# Patient Record
Sex: FEMALE | Race: WHITE | URBAN - METROPOLITAN AREA
[De-identification: names, ages, dates, MRNs, and addresses within clinical notes are randomized per-mention and may not be internally consistent; named-entity substitution may affect disease eponyms.]

---

## 2019-09-23 ENCOUNTER — OUTPATIENT (OUTPATIENT)
Dept: INPATIENT UNIT | Facility: HOSPITAL | Age: 74
LOS: 1 days | Discharge: HOME | End: 2019-09-23
Payer: MEDICARE

## 2019-09-23 VITALS
WEIGHT: 151.24 LBS | RESPIRATION RATE: 16 BRPM | TEMPERATURE: 98 F | SYSTOLIC BLOOD PRESSURE: 145 MMHG | HEIGHT: 66 IN | OXYGEN SATURATION: 100 % | HEART RATE: 63 BPM | DIASTOLIC BLOOD PRESSURE: 77 MMHG

## 2019-09-23 VITALS
OXYGEN SATURATION: 100 % | SYSTOLIC BLOOD PRESSURE: 121 MMHG | HEART RATE: 61 BPM | RESPIRATION RATE: 15 BRPM | DIASTOLIC BLOOD PRESSURE: 62 MMHG

## 2019-09-23 DIAGNOSIS — H25.11 AGE-RELATED NUCLEAR CATARACT, RIGHT EYE: ICD-10-CM

## 2019-09-23 PROCEDURE — C1780: CPT

## 2019-09-23 RX ORDER — ACETAMINOPHEN 500 MG
650 TABLET ORAL EVERY 6 HOURS
Refills: 0 | Status: DISCONTINUED | OUTPATIENT
Start: 2019-09-23 | End: 2019-09-23

## 2019-09-23 RX ORDER — PHENYLEPHRINE HCL 2.5 %
1 DROPS OPHTHALMIC (EYE)
Refills: 0 | Status: COMPLETED | OUTPATIENT
Start: 2019-09-23 | End: 2019-09-23

## 2019-09-23 RX ORDER — CYCLOPENTOLATE HYDROCHLORIDE 10 MG/ML
1 SOLUTION/ DROPS OPHTHALMIC
Refills: 0 | Status: COMPLETED | OUTPATIENT
Start: 2019-09-23 | End: 2019-09-23

## 2019-09-23 RX ORDER — OFLOXACIN 0.3 %
1 DROPS OPHTHALMIC (EYE)
Refills: 0 | Status: COMPLETED | OUTPATIENT
Start: 2019-09-23 | End: 2019-09-23

## 2019-09-23 RX ORDER — ACETAMINOPHEN 500 MG
2 TABLET ORAL
Qty: 0 | Refills: 0 | DISCHARGE
Start: 2019-09-23

## 2019-09-23 RX ORDER — TROPICAMIDE 1 %
1 DROPS OPHTHALMIC (EYE)
Refills: 0 | Status: COMPLETED | OUTPATIENT
Start: 2019-09-23 | End: 2019-09-23

## 2019-09-23 RX ADMIN — Medication 1 DROP(S): at 13:01

## 2019-09-23 RX ADMIN — Medication 1 DROP(S): at 13:09

## 2019-09-23 RX ADMIN — CYCLOPENTOLATE HYDROCHLORIDE 1 DROP(S): 10 SOLUTION/ DROPS OPHTHALMIC at 13:02

## 2019-09-23 RX ADMIN — Medication 1 DROP(S): at 13:14

## 2019-09-23 RX ADMIN — CYCLOPENTOLATE HYDROCHLORIDE 1 DROP(S): 10 SOLUTION/ DROPS OPHTHALMIC at 13:13

## 2019-09-23 RX ADMIN — Medication 1 DROP(S): at 13:02

## 2019-09-23 RX ADMIN — Medication 1 DROP(S): at 13:13

## 2019-09-23 RX ADMIN — CYCLOPENTOLATE HYDROCHLORIDE 1 DROP(S): 10 SOLUTION/ DROPS OPHTHALMIC at 13:09

## 2019-09-23 RX ADMIN — Medication 1 DROP(S): at 13:03

## 2019-09-23 NOTE — BRIEF OPERATIVE NOTE - NSICDXBRIEFPOSTOP_GEN_ALL_CORE_FT
POST-OP DIAGNOSIS:  Nuclear sclerotic cataract of right eye 23-Sep-2019 14:01:11  Kingsley Castañeda

## 2019-09-30 DIAGNOSIS — H26.9 UNSPECIFIED CATARACT: ICD-10-CM

## 2019-09-30 DIAGNOSIS — E78.5 HYPERLIPIDEMIA, UNSPECIFIED: ICD-10-CM

## 2019-09-30 DIAGNOSIS — E03.9 HYPOTHYROIDISM, UNSPECIFIED: ICD-10-CM

## 2019-09-30 DIAGNOSIS — I10 ESSENTIAL (PRIMARY) HYPERTENSION: ICD-10-CM

## 2019-09-30 DIAGNOSIS — F32.9 MAJOR DEPRESSIVE DISORDER, SINGLE EPISODE, UNSPECIFIED: ICD-10-CM

## 2019-09-30 DIAGNOSIS — H25.11 AGE-RELATED NUCLEAR CATARACT, RIGHT EYE: ICD-10-CM

## 2021-06-18 ENCOUNTER — TRANSCRIPTION ENCOUNTER (OUTPATIENT)
Age: 76
End: 2021-06-18

## 2021-09-08 ENCOUNTER — TRANSCRIPTION ENCOUNTER (OUTPATIENT)
Age: 76
End: 2021-09-08

## 2021-09-13 ENCOUNTER — INPATIENT (INPATIENT)
Facility: HOSPITAL | Age: 76
LOS: 2 days | Discharge: HOME | DRG: 177 | End: 2021-09-16
Attending: FAMILY MEDICINE | Admitting: INTERNAL MEDICINE
Payer: MEDICARE

## 2021-09-13 VITALS — HEIGHT: 66 IN | WEIGHT: 145.06 LBS

## 2021-09-13 DIAGNOSIS — U07.1 COVID-19: ICD-10-CM

## 2021-09-13 PROBLEM — E03.9 HYPOTHYROIDISM, UNSPECIFIED: Chronic | Status: ACTIVE | Noted: 2019-09-20

## 2021-09-13 PROBLEM — I10 ESSENTIAL (PRIMARY) HYPERTENSION: Chronic | Status: ACTIVE | Noted: 2019-09-20

## 2021-09-13 PROBLEM — E78.5 HYPERLIPIDEMIA, UNSPECIFIED: Chronic | Status: ACTIVE | Noted: 2019-09-20

## 2021-09-13 PROBLEM — R00.1 BRADYCARDIA, UNSPECIFIED: Chronic | Status: ACTIVE | Noted: 2019-09-20

## 2021-09-13 LAB
ALBUMIN SERPL ELPH-MCNC: 3 G/DL — LOW (ref 3.3–5)
ALP SERPL-CCNC: 59 U/L — SIGNIFICANT CHANGE UP (ref 40–120)
ALT FLD-CCNC: 11 U/L — LOW (ref 12–78)
ANION GAP SERPL CALC-SCNC: 7 MMOL/L — SIGNIFICANT CHANGE UP (ref 5–17)
AST SERPL-CCNC: 37 U/L — SIGNIFICANT CHANGE UP (ref 15–37)
BASOPHILS # BLD AUTO: 0.01 K/UL — SIGNIFICANT CHANGE UP (ref 0–0.2)
BASOPHILS NFR BLD AUTO: 0.2 % — SIGNIFICANT CHANGE UP (ref 0–2)
BILIRUB SERPL-MCNC: 0.6 MG/DL — SIGNIFICANT CHANGE UP (ref 0.2–1.2)
BUN SERPL-MCNC: 19 MG/DL — SIGNIFICANT CHANGE UP (ref 7–23)
CALCIUM SERPL-MCNC: 9.2 MG/DL — SIGNIFICANT CHANGE UP (ref 8.5–10.1)
CHLORIDE SERPL-SCNC: 99 MMOL/L — SIGNIFICANT CHANGE UP (ref 96–108)
CO2 SERPL-SCNC: 30 MMOL/L — SIGNIFICANT CHANGE UP (ref 22–31)
CREAT SERPL-MCNC: 0.85 MG/DL — SIGNIFICANT CHANGE UP (ref 0.5–1.3)
CRP SERPL-MCNC: 104 MG/L — HIGH
D DIMER BLD IA.RAPID-MCNC: 165 NG/ML DDU — SIGNIFICANT CHANGE UP
EOSINOPHIL # BLD AUTO: 0.06 K/UL — SIGNIFICANT CHANGE UP (ref 0–0.5)
EOSINOPHIL NFR BLD AUTO: 1 % — SIGNIFICANT CHANGE UP (ref 0–6)
FERRITIN SERPL-MCNC: 352 NG/ML — HIGH (ref 15–150)
GLUCOSE SERPL-MCNC: 101 MG/DL — HIGH (ref 70–99)
HCT VFR BLD CALC: 39.1 % — SIGNIFICANT CHANGE UP (ref 34.5–45)
HGB BLD-MCNC: 13.2 G/DL — SIGNIFICANT CHANGE UP (ref 11.5–15.5)
IMM GRANULOCYTES NFR BLD AUTO: 0.5 % — SIGNIFICANT CHANGE UP (ref 0–1.5)
LYMPHOCYTES # BLD AUTO: 0.73 K/UL — LOW (ref 1–3.3)
LYMPHOCYTES # BLD AUTO: 12.4 % — LOW (ref 13–44)
MCHC RBC-ENTMCNC: 28.9 PG — SIGNIFICANT CHANGE UP (ref 27–34)
MCHC RBC-ENTMCNC: 33.8 GM/DL — SIGNIFICANT CHANGE UP (ref 32–36)
MCV RBC AUTO: 85.6 FL — SIGNIFICANT CHANGE UP (ref 80–100)
MONOCYTES # BLD AUTO: 0.25 K/UL — SIGNIFICANT CHANGE UP (ref 0–0.9)
MONOCYTES NFR BLD AUTO: 4.2 % — SIGNIFICANT CHANGE UP (ref 2–14)
NEUTROPHILS # BLD AUTO: 4.82 K/UL — SIGNIFICANT CHANGE UP (ref 1.8–7.4)
NEUTROPHILS NFR BLD AUTO: 81.7 % — HIGH (ref 43–77)
PLATELET # BLD AUTO: 333 K/UL — SIGNIFICANT CHANGE UP (ref 150–400)
POTASSIUM SERPL-MCNC: 3.1 MMOL/L — LOW (ref 3.5–5.3)
POTASSIUM SERPL-SCNC: 3.1 MMOL/L — LOW (ref 3.5–5.3)
PROCALCITONIN SERPL-MCNC: 0.05 NG/ML — SIGNIFICANT CHANGE UP (ref 0.02–0.1)
PROT SERPL-MCNC: 7.2 GM/DL — SIGNIFICANT CHANGE UP (ref 6–8.3)
RBC # BLD: 4.57 M/UL — SIGNIFICANT CHANGE UP (ref 3.8–5.2)
RBC # FLD: 12.6 % — SIGNIFICANT CHANGE UP (ref 10.3–14.5)
SODIUM SERPL-SCNC: 136 MMOL/L — SIGNIFICANT CHANGE UP (ref 135–145)
WBC # BLD: 5.9 K/UL — SIGNIFICANT CHANGE UP (ref 3.8–10.5)
WBC # FLD AUTO: 5.9 K/UL — SIGNIFICANT CHANGE UP (ref 3.8–10.5)

## 2021-09-13 PROCEDURE — 80053 COMPREHEN METABOLIC PANEL: CPT

## 2021-09-13 PROCEDURE — C9399: CPT

## 2021-09-13 PROCEDURE — 83036 HEMOGLOBIN GLYCOSYLATED A1C: CPT

## 2021-09-13 PROCEDURE — 86769 SARS-COV-2 COVID-19 ANTIBODY: CPT

## 2021-09-13 PROCEDURE — 85379 FIBRIN DEGRADATION QUANT: CPT

## 2021-09-13 PROCEDURE — 71045 X-RAY EXAM CHEST 1 VIEW: CPT | Mod: 26

## 2021-09-13 PROCEDURE — 94760 N-INVAS EAR/PLS OXIMETRY 1: CPT

## 2021-09-13 PROCEDURE — 99285 EMERGENCY DEPT VISIT HI MDM: CPT | Mod: CS

## 2021-09-13 PROCEDURE — 85025 COMPLETE CBC W/AUTO DIFF WBC: CPT

## 2021-09-13 PROCEDURE — 99222 1ST HOSP IP/OBS MODERATE 55: CPT

## 2021-09-13 PROCEDURE — 85610 PROTHROMBIN TIME: CPT

## 2021-09-13 PROCEDURE — 80048 BASIC METABOLIC PNL TOTAL CA: CPT

## 2021-09-13 PROCEDURE — 80076 HEPATIC FUNCTION PANEL: CPT

## 2021-09-13 PROCEDURE — 36415 COLL VENOUS BLD VENIPUNCTURE: CPT

## 2021-09-13 PROCEDURE — 82248 BILIRUBIN DIRECT: CPT

## 2021-09-13 PROCEDURE — 93010 ELECTROCARDIOGRAM REPORT: CPT

## 2021-09-13 RX ORDER — HYDROCHLOROTHIAZIDE 25 MG
12.5 TABLET ORAL DAILY
Refills: 0 | Status: DISCONTINUED | OUTPATIENT
Start: 2021-09-13 | End: 2021-09-16

## 2021-09-13 RX ORDER — DEXAMETHASONE 0.5 MG/5ML
6 ELIXIR ORAL DAILY
Refills: 0 | Status: DISCONTINUED | OUTPATIENT
Start: 2021-09-14 | End: 2021-09-16

## 2021-09-13 RX ORDER — SIMVASTATIN 20 MG/1
1 TABLET, FILM COATED ORAL
Qty: 0 | Refills: 0 | DISCHARGE

## 2021-09-13 RX ORDER — REMDESIVIR 5 MG/ML
100 INJECTION INTRAVENOUS EVERY 24 HOURS
Refills: 0 | Status: DISCONTINUED | OUTPATIENT
Start: 2021-09-14 | End: 2021-09-16

## 2021-09-13 RX ORDER — LATANOPROST 0.05 MG/ML
1 SOLUTION/ DROPS OPHTHALMIC; TOPICAL
Qty: 0 | Refills: 0 | DISCHARGE

## 2021-09-13 RX ORDER — REMDESIVIR 5 MG/ML
200 INJECTION INTRAVENOUS EVERY 24 HOURS
Refills: 0 | Status: COMPLETED | OUTPATIENT
Start: 2021-09-13 | End: 2021-09-13

## 2021-09-13 RX ORDER — LISINOPRIL/HYDROCHLOROTHIAZIDE 10-12.5 MG
1 TABLET ORAL
Qty: 0 | Refills: 0 | DISCHARGE

## 2021-09-13 RX ORDER — REMDESIVIR 5 MG/ML
INJECTION INTRAVENOUS
Refills: 0 | Status: DISCONTINUED | OUTPATIENT
Start: 2021-09-13 | End: 2021-09-16

## 2021-09-13 RX ORDER — AMLODIPINE BESYLATE 2.5 MG/1
1 TABLET ORAL
Qty: 0 | Refills: 0 | DISCHARGE

## 2021-09-13 RX ORDER — LATANOPROST 0.05 MG/ML
1 SOLUTION/ DROPS OPHTHALMIC; TOPICAL AT BEDTIME
Refills: 0 | Status: DISCONTINUED | OUTPATIENT
Start: 2021-09-13 | End: 2021-09-16

## 2021-09-13 RX ORDER — LISINOPRIL 2.5 MG/1
20 TABLET ORAL DAILY
Refills: 0 | Status: DISCONTINUED | OUTPATIENT
Start: 2021-09-13 | End: 2021-09-16

## 2021-09-13 RX ORDER — CITALOPRAM 10 MG/1
1 TABLET, FILM COATED ORAL
Qty: 0 | Refills: 0 | DISCHARGE

## 2021-09-13 RX ORDER — AMLODIPINE BESYLATE 2.5 MG/1
2.5 TABLET ORAL DAILY
Refills: 0 | Status: DISCONTINUED | OUTPATIENT
Start: 2021-09-13 | End: 2021-09-16

## 2021-09-13 RX ORDER — DEXAMETHASONE 0.5 MG/5ML
6 ELIXIR ORAL ONCE
Refills: 0 | Status: COMPLETED | OUTPATIENT
Start: 2021-09-13 | End: 2021-09-13

## 2021-09-13 RX ORDER — SIMVASTATIN 20 MG/1
20 TABLET, FILM COATED ORAL AT BEDTIME
Refills: 0 | Status: DISCONTINUED | OUTPATIENT
Start: 2021-09-13 | End: 2021-09-16

## 2021-09-13 RX ORDER — DORZOLAMIDE HYDROCHLORIDE TIMOLOL MALEATE 20; 5 MG/ML; MG/ML
1 SOLUTION/ DROPS OPHTHALMIC
Refills: 0 | Status: DISCONTINUED | OUTPATIENT
Start: 2021-09-13 | End: 2021-09-16

## 2021-09-13 RX ORDER — POTASSIUM CHLORIDE 20 MEQ
40 PACKET (EA) ORAL ONCE
Refills: 0 | Status: COMPLETED | OUTPATIENT
Start: 2021-09-13 | End: 2021-09-13

## 2021-09-13 RX ORDER — LOSARTAN POTASSIUM 100 MG/1
0 TABLET, FILM COATED ORAL
Qty: 0 | Refills: 0 | DISCHARGE

## 2021-09-13 RX ORDER — LEVOTHYROXINE SODIUM 125 MCG
1 TABLET ORAL
Qty: 0 | Refills: 0 | DISCHARGE

## 2021-09-13 RX ORDER — CITALOPRAM 10 MG/1
40 TABLET, FILM COATED ORAL DAILY
Refills: 0 | Status: DISCONTINUED | OUTPATIENT
Start: 2021-09-13 | End: 2021-09-16

## 2021-09-13 RX ORDER — LEVOTHYROXINE SODIUM 125 MCG
88 TABLET ORAL DAILY
Refills: 0 | Status: DISCONTINUED | OUTPATIENT
Start: 2021-09-13 | End: 2021-09-16

## 2021-09-13 RX ORDER — ENOXAPARIN SODIUM 100 MG/ML
40 INJECTION SUBCUTANEOUS DAILY
Refills: 0 | Status: DISCONTINUED | OUTPATIENT
Start: 2021-09-13 | End: 2021-09-16

## 2021-09-13 RX ORDER — DORZOLAMIDE HYDROCHLORIDE TIMOLOL MALEATE 20; 5 MG/ML; MG/ML
1 SOLUTION/ DROPS OPHTHALMIC
Qty: 0 | Refills: 0 | DISCHARGE

## 2021-09-13 RX ORDER — ACETAMINOPHEN 500 MG
650 TABLET ORAL EVERY 4 HOURS
Refills: 0 | Status: DISCONTINUED | OUTPATIENT
Start: 2021-09-13 | End: 2021-09-16

## 2021-09-13 RX ORDER — SODIUM CHLORIDE 9 MG/ML
1000 INJECTION INTRAMUSCULAR; INTRAVENOUS; SUBCUTANEOUS ONCE
Refills: 0 | Status: COMPLETED | OUTPATIENT
Start: 2021-09-13 | End: 2021-09-13

## 2021-09-13 RX ADMIN — Medication 6 MILLIGRAM(S): at 14:36

## 2021-09-13 RX ADMIN — ENOXAPARIN SODIUM 40 MILLIGRAM(S): 100 INJECTION SUBCUTANEOUS at 17:50

## 2021-09-13 RX ADMIN — AMLODIPINE BESYLATE 2.5 MILLIGRAM(S): 2.5 TABLET ORAL at 17:50

## 2021-09-13 RX ADMIN — SODIUM CHLORIDE 1000 MILLILITER(S): 9 INJECTION INTRAMUSCULAR; INTRAVENOUS; SUBCUTANEOUS at 11:58

## 2021-09-13 RX ADMIN — Medication 12.5 MILLIGRAM(S): at 17:50

## 2021-09-13 RX ADMIN — Medication 650 MILLIGRAM(S): at 23:26

## 2021-09-13 RX ADMIN — REMDESIVIR 500 MILLIGRAM(S): 5 INJECTION INTRAVENOUS at 17:50

## 2021-09-13 RX ADMIN — Medication 650 MILLIGRAM(S): at 17:58

## 2021-09-13 RX ADMIN — Medication 40 MILLIEQUIVALENT(S): at 14:35

## 2021-09-13 RX ADMIN — LATANOPROST 1 DROP(S): 0.05 SOLUTION/ DROPS OPHTHALMIC; TOPICAL at 22:59

## 2021-09-13 RX ADMIN — LISINOPRIL 20 MILLIGRAM(S): 2.5 TABLET ORAL at 17:50

## 2021-09-13 RX ADMIN — DORZOLAMIDE HYDROCHLORIDE TIMOLOL MALEATE 1 DROP(S): 20; 5 SOLUTION/ DROPS OPHTHALMIC at 17:50

## 2021-09-13 RX ADMIN — SIMVASTATIN 20 MILLIGRAM(S): 20 TABLET, FILM COATED ORAL at 22:59

## 2021-09-13 RX ADMIN — CITALOPRAM 40 MILLIGRAM(S): 10 TABLET, FILM COATED ORAL at 17:50

## 2021-09-13 NOTE — ED ADULT TRIAGE NOTE - CHIEF COMPLAINT QUOTE
Pt. to the ED BIBA from home and sent by Family for weakness, Malaise and Fatigue x 10 days- Pt. Covid + x 10 days - EMS reports low oxygen status 86 RA-- Hx. of HTN and Thyroid

## 2021-09-13 NOTE — H&P ADULT - NSHPLABSRESULTS_GEN_ALL_CORE
Lab Results:  CBC  CBC Full  -  ( 13 Sep 2021 11:54 )  WBC Count : 5.90 K/uL  RBC Count : 4.57 M/uL  Hemoglobin : 13.2 g/dL  Hematocrit : 39.1 %  Platelet Count - Automated : 333 K/uL  Mean Cell Volume : 85.6 fl  Mean Cell Hemoglobin : 28.9 pg  Mean Cell Hemoglobin Concentration : 33.8 gm/dL  Auto Neutrophil # : 4.82 K/uL  Auto Lymphocyte # : 0.73 K/uL  Auto Monocyte # : 0.25 K/uL  Auto Eosinophil # : 0.06 K/uL  Auto Basophil # : 0.01 K/uL  Auto Neutrophil % : 81.7 %  Auto Lymphocyte % : 12.4 %  Auto Monocyte % : 4.2 %  Auto Eosinophil % : 1.0 %  Auto Basophil % : 0.2 %    .		Differential:	[] Automated		[] Manual  Chemistry                        13.2   5.90  )-----------( 333      ( 13 Sep 2021 11:54 )             39.1     09-13    136  |  99  |  19  ----------------------------<  101<H>  3.1<L>   |  30  |  0.85    Ca    9.2      13 Sep 2021 11:54    TPro  7.2  /  Alb  3.0<L>  /  TBili  0.6  /  DBili  x   /  AST  37  /  ALT  11<L>  /  AlkPhos  59  09-13    LIVER FUNCTIONS - ( 13 Sep 2021 11:54 )  Alb: 3.0 g/dL / Pro: 7.2 gm/dL / ALK PHOS: 59 U/L / ALT: 11 U/L / AST: 37 U/L / GGT: x           RADIOLOGY RESULTS:    cxr:  infiltrate    MEDICATIONS  (STANDING):  amLODIPine   Tablet 2.5 milliGRAM(s) Oral daily  citalopram 40 milliGRAM(s) Oral daily  dorzolamide 2%/timolol 0.5% Ophthalmic Solution 1 Drop(s) Both EYES two times a day  enoxaparin Injectable 40 milliGRAM(s) SubCutaneous daily  hydrochlorothiazide 12.5 milliGRAM(s) Oral daily  latanoprost 0.005% Ophthalmic Solution 1 Drop(s) Both EYES at bedtime  levothyroxine 88 MICROGram(s) Oral daily  lisinopril 20 milliGRAM(s) Oral daily  remdesivir  IVPB   IV Intermittent   remdesivir  IVPB 200 milliGRAM(s) IV Intermittent every 24 hours  simvastatin 20 milliGRAM(s) Oral at bedtime    MEDICATIONS  (PRN):  acetaminophen   Tablet .. 650 milliGRAM(s) Oral every 4 hours PRN Temp greater or equal to 38.5C (101.3F)  benzonatate 100 milliGRAM(s) Oral three times a day PRN Cough

## 2021-09-13 NOTE — H&P ADULT - NSHPPOADEEPVENOUSTHROMB_GEN_A_CORE
How Severe Is Your Skin Cancer?: moderate Is This A New Presentation, Or A Follow-Up?: Basal Cell Carcinoma Location From Outside Provider (Will Override Previously Chosen Location): Left anti-tragus When Was Basal Cell Biopsied? (Optional): 3-3-18 Accession # (Optional): [id number] no

## 2021-09-13 NOTE — H&P ADULT - HISTORY OF PRESENT ILLNESS
76/F with PMHx of recently positive for COVID 19, bradycardia, hypothyroidism, HLD, HTN, was brought to the ED for c/o weakness  and fatigue x 10 days. Pt Dx with  COVID 10 days ago. No N/V/D. Denies CP or SOB. In ED was hypoxic at 93% ra. She got decadron in ED.

## 2021-09-13 NOTE — ED PROVIDER NOTE - NS ED ROS FT
Constitutional: No fever or chills. (+) generalized weakness, fatigue   Eyes: No visual changes  HEENT: No throat pain  CV: No chest pain  Resp: No SOB no cough  GI: No abd pain, nausea or vomiting  : No dysuria  MSK: No musculoskeletal pain  Skin: No rash  Neuro: No headache

## 2021-09-13 NOTE — ED PROVIDER NOTE - OBJECTIVE STATEMENT
75 y/o F with a PMHx of bradycardia, hypothyroid, HLD, HTN presents to the ED BIBEMS from home c/o weakness  and fatigue x 10 days. Pt is (+) COVID x 10 days. Daughter  made pt come in for further evaluation. Denies any discrete pain. No N/V/D. Denies CP or SOB. Pt states she has been eating, drinking and urinating as per baseline.

## 2021-09-13 NOTE — ED PROVIDER NOTE - EKG ADDITIONAL QUESTION - PERFORMED INDEPENDENT VISUALIZATION
58 y.o presenting right right lateral leg laceration, endorses she got out of her car and something sharp in trash bag caused the laceration. denies head injury, cp, sob, n, v. endorses history of dvt, on eliquis.
details…
Yes

## 2021-09-13 NOTE — H&P ADULT - ASSESSMENT
76/F with PMHx of recently positive for COVID 19, bradycardia, hypothyroidism, HLD, HTN, was brought to the ED for c/o weakness  and fatigue x 10 days. Pt Dx with  COVID 10 days ago. No N/V/D. Denies CP or SOB. In ED was hypoxic at 93% ra. She got decadron in ED.     Pt admitted with     1) COVID PNA + ac hypoxic resp failure :  admit  iv decadron  iv remdesivir  ID consult  supportive O2  isolation precautions  d dimers neg    2) Hypokalemia 3.1: replaced, recheck    3) HLD: statin    4) HTN: cont ace i, ccb    5) Hypothyroidism: synthroid 88 mcg    6) DVT PPX: lovenox

## 2021-09-13 NOTE — ED PROVIDER NOTE - CLINICAL SUMMARY MEDICAL DECISION MAKING FREE TEXT BOX
75 y/o F with a PMHx of bradycardia, hypothyroid, HLD, HTN presents to the ED COVID x 10 days. Pt has been fatigued and weak. Daughter made pt come in for evaluation. O2 93% when pt presented Pt states she would like to go home, does not want to be admitted. Plan: CXR, blood work, ambulate pt to see if O2 stat drops that she becomes hypoxic.

## 2021-09-13 NOTE — H&P ADULT - NSHPPHYSICALEXAM_GEN_ALL_CORE
PHYSICAL EXAM:    Daily Height in cm: 167.64 (13 Sep 2021 11:05)    Daily     ICU Vital Signs Last 24 Hrs  T(C): 37.6 (13 Sep 2021 15:09), Max: 37.6 (13 Sep 2021 15:09)  T(F): 99.7 (13 Sep 2021 15:09), Max: 99.7 (13 Sep 2021 15:09)  HR: 81 (13 Sep 2021 15:09) (72 - 81)  BP: 134/74 (13 Sep 2021 15:09) (132/61 - 134/74)  BP(mean): 91 (13 Sep 2021 15:09) (91 - 91)  ABP: --  ABP(mean): --  RR: 22 (13 Sep 2021 15:09) (16 - 22)  SpO2: 99% (13 Sep 2021 15:09) (93% - 99%)      Constitutional: Weak appearing  HEENT: Atraumatic, ARIADNE, Normal, No congestion  Respiratory: Breath Sounds normal, no rhonchi/wheeze  Cardiovascular: N S1S2;   Gastrointestinal: Abdomen soft, non tender, Bowel Sounds present  Extremities: No edema, peripheral pulses present  Neurological: AAO x 2, no gross focal motor deficits  Skin: Non cellulitic, no rash, ulcers  Lymph Nodes: No lymphadenopathy noted  Back: No CVA tenderness   Musculoskeletal: non tender  Breasts: Deferred  Genitourinary: deferred  Rectal: Deferred

## 2021-09-13 NOTE — ED PROVIDER NOTE - CARE PLAN
1 Principal Discharge DX:	2019 novel coronavirus disease (COVID-19)  Secondary Diagnosis:	Acute respiratory failure with hypoxia

## 2021-09-14 LAB
A1C WITH ESTIMATED AVERAGE GLUCOSE RESULT: 6.2 % — HIGH (ref 4–5.6)
ANION GAP SERPL CALC-SCNC: 9 MMOL/L — SIGNIFICANT CHANGE UP (ref 5–17)
BUN SERPL-MCNC: 22 MG/DL — SIGNIFICANT CHANGE UP (ref 7–23)
CALCIUM SERPL-MCNC: 8.8 MG/DL — SIGNIFICANT CHANGE UP (ref 8.5–10.1)
CHLORIDE SERPL-SCNC: 105 MMOL/L — SIGNIFICANT CHANGE UP (ref 96–108)
CO2 SERPL-SCNC: 25 MMOL/L — SIGNIFICANT CHANGE UP (ref 22–31)
COVID-19 SPIKE DOMAIN AB INTERP: POSITIVE
COVID-19 SPIKE DOMAIN ANTIBODY RESULT: >250 U/ML — HIGH
CREAT SERPL-MCNC: 0.78 MG/DL — SIGNIFICANT CHANGE UP (ref 0.5–1.3)
ESTIMATED AVERAGE GLUCOSE: 131 MG/DL — HIGH (ref 68–114)
GLUCOSE SERPL-MCNC: 125 MG/DL — HIGH (ref 70–99)
INR BLD: 1.24 RATIO — HIGH (ref 0.88–1.16)
POTASSIUM SERPL-MCNC: 3.7 MMOL/L — SIGNIFICANT CHANGE UP (ref 3.5–5.3)
POTASSIUM SERPL-SCNC: 3.7 MMOL/L — SIGNIFICANT CHANGE UP (ref 3.5–5.3)
PROTHROM AB SERPL-ACNC: 14.4 SEC — HIGH (ref 10.6–13.6)
SARS-COV-2 IGG+IGM SERPL QL IA: >250 U/ML — HIGH
SARS-COV-2 IGG+IGM SERPL QL IA: POSITIVE
SODIUM SERPL-SCNC: 139 MMOL/L — SIGNIFICANT CHANGE UP (ref 135–145)

## 2021-09-14 PROCEDURE — 99232 SBSQ HOSP IP/OBS MODERATE 35: CPT

## 2021-09-14 RX ADMIN — ENOXAPARIN SODIUM 40 MILLIGRAM(S): 100 INJECTION SUBCUTANEOUS at 10:06

## 2021-09-14 RX ADMIN — DORZOLAMIDE HYDROCHLORIDE TIMOLOL MALEATE 1 DROP(S): 20; 5 SOLUTION/ DROPS OPHTHALMIC at 17:11

## 2021-09-14 RX ADMIN — Medication 88 MICROGRAM(S): at 05:13

## 2021-09-14 RX ADMIN — DORZOLAMIDE HYDROCHLORIDE TIMOLOL MALEATE 1 DROP(S): 20; 5 SOLUTION/ DROPS OPHTHALMIC at 05:38

## 2021-09-14 RX ADMIN — Medication 12.5 MILLIGRAM(S): at 10:07

## 2021-09-14 RX ADMIN — Medication 100 MILLIGRAM(S): at 05:38

## 2021-09-14 RX ADMIN — CITALOPRAM 40 MILLIGRAM(S): 10 TABLET, FILM COATED ORAL at 10:07

## 2021-09-14 RX ADMIN — LATANOPROST 1 DROP(S): 0.05 SOLUTION/ DROPS OPHTHALMIC; TOPICAL at 21:40

## 2021-09-14 RX ADMIN — AMLODIPINE BESYLATE 2.5 MILLIGRAM(S): 2.5 TABLET ORAL at 10:06

## 2021-09-14 RX ADMIN — Medication 6 MILLIGRAM(S): at 05:13

## 2021-09-14 RX ADMIN — LISINOPRIL 20 MILLIGRAM(S): 2.5 TABLET ORAL at 10:06

## 2021-09-14 RX ADMIN — SIMVASTATIN 20 MILLIGRAM(S): 20 TABLET, FILM COATED ORAL at 21:40

## 2021-09-14 RX ADMIN — Medication 100 MILLIGRAM(S): at 10:07

## 2021-09-14 RX ADMIN — REMDESIVIR 500 MILLIGRAM(S): 5 INJECTION INTRAVENOUS at 17:48

## 2021-09-14 NOTE — CONSULT NOTE ADULT - SUBJECTIVE AND OBJECTIVE BOX
Patient is a 76y old  Female who presents with a chief complaint of covid pna (14 Sep 2021 09:10)    HPI:  76/F with PMHx of recently positive for COVID 19, bradycardia, hypothyroidism, HLD, HTN, not vaccinated against COVID-19 given that she "did not want to" admitted on  for evaluation of weakness and fatigue over 10 days since her diagnosis, upon admission was hypoxic and is on supplemental oxygen. Her son- in - law has COVID-19 infection.         PMH: as above  PSH: as above  Meds: per reconciliation sheet, noted below  MEDICATIONS  (STANDING):  amLODIPine   Tablet 2.5 milliGRAM(s) Oral daily  citalopram 40 milliGRAM(s) Oral daily  dexAMETHasone  Injectable 6 milliGRAM(s) IV Push daily  dorzolamide 2%/timolol 0.5% Ophthalmic Solution 1 Drop(s) Both EYES two times a day  enoxaparin Injectable 40 milliGRAM(s) SubCutaneous daily  hydrochlorothiazide 12.5 milliGRAM(s) Oral daily  latanoprost 0.005% Ophthalmic Solution 1 Drop(s) Both EYES at bedtime  levothyroxine 88 MICROGram(s) Oral daily  lisinopril 20 milliGRAM(s) Oral daily  remdesivir  IVPB   IV Intermittent   remdesivir  IVPB 100 milliGRAM(s) IV Intermittent every 24 hours  simvastatin 20 milliGRAM(s) Oral at bedtime    MEDICATIONS  (PRN):  acetaminophen   Tablet .. 650 milliGRAM(s) Oral every 4 hours PRN Temp greater or equal to 38.5C (101.3F)  benzonatate 100 milliGRAM(s) Oral three times a day PRN Cough    Allergies    No Known Allergies    Intolerances      Social: no smoking, no alcohol, no illegal drugs; no recent travel, no exposure to TB  FAMILY HISTORY:  Family history unobtainable due to patient&#x27;s condition       no history of premature cardiovascular disease in first degree relatives  ROS: the patient denies fever, no chills, no HA, no dizziness, no sore throat, no blurry vision, no CP, no palpitations,  no abdominal pain, no diarrhea, no N/V, no dysuria, no leg pain, no claudication, no rash, no joint aches, no rectal pain or bleeding, no night sweats  All other systems reviewed and are negative    Vital Signs Last 24 Hrs  T(C): 36.7 (14 Sep 2021 08:19), Max: 36.7 (14 Sep 2021 08:19)  T(F): 98 (14 Sep 2021 08:19), Max: 98 (14 Sep 2021 08:19)  HR: 79 (14 Sep 2021 08:19) (60 - 79)  BP: 117/64 (14 Sep 2021 08:19) (117/64 - 139/81)  BP(mean): 99 (13 Sep 2021 20:05) (99 - 99)  RR: 18 (14 Sep 2021 08:19) (18 - 20)  SpO2: 91% (14 Sep 2021 11:29) (91% - 97%)  Daily     Daily Weight in k (13 Sep 2021 21:04)    PE:    Constitutional: frail looking  HEENT: NC/AT, EOMI, PERRLA, conjunctivae clear; ears and nose atraumatic; pharynx clear  Neck: supple; thyroid not palpable  Back: no tenderness  Respiratory: respiratory effort normal; clear to auscultation  Cardiovascular: S1S2 regular, no murmurs  Abdomen: soft, not tender, not distended, positive BS; no liver or spleen organomegaly  Genitourinary: no suprapubic tenderness  Musculoskeletal: no muscle tenderness, no joint swelling or tenderness  Neurological/ Psychiatric: AxOx3, judgement and insight normal;  moving all extremities  Skin: no rashes; no palpable lesions    Labs: all available labs reviewed                        13.2   5.90  )-----------( 333      ( 13 Sep 2021 11:54 )             39.1     09-14    139  |  105  |  22  ----------------------------<  125<H>  3.7   |  25  |  0.78    Ca    8.8      14 Sep 2021 06:59    TPro  6.6  /  Alb  2.5<L>  /  TBili  0.5  /  DBili  0.1  /  AST  21  /  ALT  11<L>  /  AlkPhos  51  09-14     LIVER FUNCTIONS - ( 14 Sep 2021 06:59 )  Alb: 2.5 g/dL / Pro: 6.6 gm/dL / ALK PHOS: 51 U/L / ALT: 11 U/L / AST: 21 U/L / GGT: x             < from: Xray Chest 1 View-PORTABLE IMMEDIATE (21 @ 12:33) >  IMPRESSION:   Bilateral peripheral  multifocal and diffuse ill-defined airspace opacities..    < end of copied text >      Radiology: all available radiological tests reviewed    Advanced directives addressed: full resuscitation

## 2021-09-14 NOTE — CONSULT NOTE ADULT - ASSESSMENT
76/F with PMHx of recently positive for COVID 19, bradycardia, hypothyroidism, HLD, HTN, not vaccinated against COVID-19 given that she "did not want to" admitted on 9/13 for evaluation of weakness and fatigue over 10 days since her diagnosis, upon admission was hypoxic and is on supplemental oxygen. Her son- in - law has COVID-19 infection.   1. Patient admitted with COVID-19 infection superimposed on viral pneumonia  - follow up cultures   - serial cbc and monitor temperature   - reviewed prior medical records to evaluate for resistant or atypical pathogens   - oxygen and nebs as needed   - iv hydration and supportive care   - continue decadron as ordered  - on remdesivir and will monitor renal and hepatic function  -  no role for antibiotics  - continue isolation   2. other issues: per medicine

## 2021-09-14 NOTE — PROGRESS NOTE ADULT - SUBJECTIVE AND OBJECTIVE BOX
76/F with PMHx of recently positive for COVID 19, bradycardia, hypothyroidism, HLD, HTN, was brought to the ED for c/o weakness  and fatigue x 10 days. Pt Dx with  COVID 10 days ago. No N/V/D. Denies CP or SOB. In ED was hypoxic at 93% ra. She got decadron in ED.     Constitutional: Weak appearing  HEENT: Atraumatic, ARIADNE, Normal, No congestion  Respiratory: Breath Sounds normal, no rhonchi/wheeze  Cardiovascular: N S1S2;   Gastrointestinal: Abdomen soft, non tender, Bowel Sounds present  Extremities: No edema, peripheral pulses present  Neurological: AAO x 2, no gross focal motor deficits  Skin: Non cellulitic, no rash, ulcers  Lymph Nodes: No lymphadenopathy noted  Back: No CVA tenderness   Musculoskeletal: non tender    labs reviewed

## 2021-09-15 LAB
ALBUMIN SERPL ELPH-MCNC: 2.5 G/DL — LOW (ref 3.3–5)
ALP SERPL-CCNC: 47 U/L — SIGNIFICANT CHANGE UP (ref 40–120)
ALT FLD-CCNC: 9 U/L — LOW (ref 12–78)
ANION GAP SERPL CALC-SCNC: 8 MMOL/L — SIGNIFICANT CHANGE UP (ref 5–17)
AST SERPL-CCNC: 13 U/L — LOW (ref 15–37)
BASOPHILS # BLD AUTO: 0.02 K/UL — SIGNIFICANT CHANGE UP (ref 0–0.2)
BASOPHILS NFR BLD AUTO: 0.1 % — SIGNIFICANT CHANGE UP (ref 0–2)
BILIRUB SERPL-MCNC: 0.3 MG/DL — SIGNIFICANT CHANGE UP (ref 0.2–1.2)
BUN SERPL-MCNC: 29 MG/DL — HIGH (ref 7–23)
CALCIUM SERPL-MCNC: 8.8 MG/DL — SIGNIFICANT CHANGE UP (ref 8.5–10.1)
CHLORIDE SERPL-SCNC: 106 MMOL/L — SIGNIFICANT CHANGE UP (ref 96–108)
CO2 SERPL-SCNC: 26 MMOL/L — SIGNIFICANT CHANGE UP (ref 22–31)
CREAT SERPL-MCNC: 0.74 MG/DL — SIGNIFICANT CHANGE UP (ref 0.5–1.3)
D DIMER BLD IA.RAPID-MCNC: <150 NG/ML DDU — SIGNIFICANT CHANGE UP
EOSINOPHIL # BLD AUTO: 0 K/UL — SIGNIFICANT CHANGE UP (ref 0–0.5)
EOSINOPHIL NFR BLD AUTO: 0 % — SIGNIFICANT CHANGE UP (ref 0–6)
GLUCOSE SERPL-MCNC: 131 MG/DL — HIGH (ref 70–99)
HCT VFR BLD CALC: 37.6 % — SIGNIFICANT CHANGE UP (ref 34.5–45)
HGB BLD-MCNC: 12.6 G/DL — SIGNIFICANT CHANGE UP (ref 11.5–15.5)
IMM GRANULOCYTES NFR BLD AUTO: 0.6 % — SIGNIFICANT CHANGE UP (ref 0–1.5)
INR BLD: 1.33 RATIO — HIGH (ref 0.88–1.16)
LYMPHOCYTES # BLD AUTO: 0.74 K/UL — LOW (ref 1–3.3)
LYMPHOCYTES # BLD AUTO: 4.7 % — LOW (ref 13–44)
MCHC RBC-ENTMCNC: 28.6 PG — SIGNIFICANT CHANGE UP (ref 27–34)
MCHC RBC-ENTMCNC: 33.5 GM/DL — SIGNIFICANT CHANGE UP (ref 32–36)
MCV RBC AUTO: 85.3 FL — SIGNIFICANT CHANGE UP (ref 80–100)
MONOCYTES # BLD AUTO: 0.49 K/UL — SIGNIFICANT CHANGE UP (ref 0–0.9)
MONOCYTES NFR BLD AUTO: 3.1 % — SIGNIFICANT CHANGE UP (ref 2–14)
NEUTROPHILS # BLD AUTO: 14.38 K/UL — HIGH (ref 1.8–7.4)
NEUTROPHILS NFR BLD AUTO: 91.5 % — HIGH (ref 43–77)
PLATELET # BLD AUTO: 515 K/UL — HIGH (ref 150–400)
POTASSIUM SERPL-MCNC: 3.4 MMOL/L — LOW (ref 3.5–5.3)
POTASSIUM SERPL-SCNC: 3.4 MMOL/L — LOW (ref 3.5–5.3)
PROT SERPL-MCNC: 6.3 GM/DL — SIGNIFICANT CHANGE UP (ref 6–8.3)
PROTHROM AB SERPL-ACNC: 15.2 SEC — HIGH (ref 10.6–13.6)
RBC # BLD: 4.41 M/UL — SIGNIFICANT CHANGE UP (ref 3.8–5.2)
RBC # FLD: 12.6 % — SIGNIFICANT CHANGE UP (ref 10.3–14.5)
SODIUM SERPL-SCNC: 140 MMOL/L — SIGNIFICANT CHANGE UP (ref 135–145)
WBC # BLD: 15.73 K/UL — HIGH (ref 3.8–10.5)
WBC # FLD AUTO: 15.73 K/UL — HIGH (ref 3.8–10.5)

## 2021-09-15 PROCEDURE — 99232 SBSQ HOSP IP/OBS MODERATE 35: CPT

## 2021-09-15 RX ORDER — LANOLIN ALCOHOL/MO/W.PET/CERES
3 CREAM (GRAM) TOPICAL AT BEDTIME
Refills: 0 | Status: DISCONTINUED | OUTPATIENT
Start: 2021-09-15 | End: 2021-09-16

## 2021-09-15 RX ORDER — POTASSIUM CHLORIDE 20 MEQ
40 PACKET (EA) ORAL ONCE
Refills: 0 | Status: COMPLETED | OUTPATIENT
Start: 2021-09-15 | End: 2021-09-15

## 2021-09-15 RX ADMIN — Medication 40 MILLIEQUIVALENT(S): at 17:16

## 2021-09-15 RX ADMIN — Medication 6 MILLIGRAM(S): at 05:18

## 2021-09-15 RX ADMIN — Medication 12.5 MILLIGRAM(S): at 10:22

## 2021-09-15 RX ADMIN — CITALOPRAM 40 MILLIGRAM(S): 10 TABLET, FILM COATED ORAL at 10:22

## 2021-09-15 RX ADMIN — REMDESIVIR 500 MILLIGRAM(S): 5 INJECTION INTRAVENOUS at 17:16

## 2021-09-15 RX ADMIN — Medication 88 MICROGRAM(S): at 05:18

## 2021-09-15 RX ADMIN — AMLODIPINE BESYLATE 2.5 MILLIGRAM(S): 2.5 TABLET ORAL at 10:22

## 2021-09-15 RX ADMIN — Medication 650 MILLIGRAM(S): at 21:41

## 2021-09-15 RX ADMIN — DORZOLAMIDE HYDROCHLORIDE TIMOLOL MALEATE 1 DROP(S): 20; 5 SOLUTION/ DROPS OPHTHALMIC at 16:50

## 2021-09-15 RX ADMIN — LATANOPROST 1 DROP(S): 0.05 SOLUTION/ DROPS OPHTHALMIC; TOPICAL at 21:42

## 2021-09-15 RX ADMIN — DORZOLAMIDE HYDROCHLORIDE TIMOLOL MALEATE 1 DROP(S): 20; 5 SOLUTION/ DROPS OPHTHALMIC at 05:18

## 2021-09-15 RX ADMIN — Medication 3 MILLIGRAM(S): at 21:41

## 2021-09-15 RX ADMIN — Medication 100 MILLIGRAM(S): at 05:32

## 2021-09-15 RX ADMIN — LISINOPRIL 20 MILLIGRAM(S): 2.5 TABLET ORAL at 10:22

## 2021-09-15 RX ADMIN — ENOXAPARIN SODIUM 40 MILLIGRAM(S): 100 INJECTION SUBCUTANEOUS at 10:22

## 2021-09-15 RX ADMIN — Medication 100 MILLIGRAM(S): at 21:41

## 2021-09-15 RX ADMIN — SIMVASTATIN 20 MILLIGRAM(S): 20 TABLET, FILM COATED ORAL at 21:41

## 2021-09-15 NOTE — PROGRESS NOTE ADULT - SUBJECTIVE AND OBJECTIVE BOX
76/F with PMHx of recently positive for COVID 19, bradycardia, hypothyroidism, HLD, HTN, was brought to the ED for c/o weakness  and fatigue x 10 days. Pt Dx with  COVID 10 days ago. No N/V/D. Denies CP or SOB. In ED was hypoxic at 93% ra. She got decadron in ED.   9/15 feel better  Constitutional: Weak appearing  HEENT: Atraumatic, ARIADNE, Normal, No congestion  Respiratory: Breath Sounds normal, no rhonchi/wheeze  Cardiovascular: N S1S2;   Gastrointestinal: Abdomen soft, non tender, Bowel Sounds present  Extremities: No edema, peripheral pulses present  Neurological: AAO x 2, no gross focal motor deficits  Skin: Non cellulitic, no rash, ulcers  Lymph Nodes: No lymphadenopathy noted  Back: No CVA tenderness   Musculoskeletal: non tender    labs reviewed

## 2021-09-16 ENCOUNTER — TRANSCRIPTION ENCOUNTER (OUTPATIENT)
Age: 76
End: 2021-09-16

## 2021-09-16 VITALS
DIASTOLIC BLOOD PRESSURE: 62 MMHG | SYSTOLIC BLOOD PRESSURE: 121 MMHG | TEMPERATURE: 98 F | HEART RATE: 79 BPM | OXYGEN SATURATION: 97 % | RESPIRATION RATE: 18 BRPM

## 2021-09-16 LAB
ALBUMIN SERPL ELPH-MCNC: 2.4 G/DL — LOW (ref 3.3–5)
ALP SERPL-CCNC: 42 U/L — SIGNIFICANT CHANGE UP (ref 40–120)
ALT FLD-CCNC: 10 U/L — LOW (ref 12–78)
ANION GAP SERPL CALC-SCNC: 7 MMOL/L — SIGNIFICANT CHANGE UP (ref 5–17)
AST SERPL-CCNC: 14 U/L — LOW (ref 15–37)
BASOPHILS # BLD AUTO: 0.01 K/UL — SIGNIFICANT CHANGE UP (ref 0–0.2)
BASOPHILS NFR BLD AUTO: 0.1 % — SIGNIFICANT CHANGE UP (ref 0–2)
BILIRUB SERPL-MCNC: 0.2 MG/DL — SIGNIFICANT CHANGE UP (ref 0.2–1.2)
BUN SERPL-MCNC: 25 MG/DL — HIGH (ref 7–23)
CALCIUM SERPL-MCNC: 8.5 MG/DL — SIGNIFICANT CHANGE UP (ref 8.5–10.1)
CHLORIDE SERPL-SCNC: 106 MMOL/L — SIGNIFICANT CHANGE UP (ref 96–108)
CO2 SERPL-SCNC: 26 MMOL/L — SIGNIFICANT CHANGE UP (ref 22–31)
CREAT SERPL-MCNC: 0.67 MG/DL — SIGNIFICANT CHANGE UP (ref 0.5–1.3)
EOSINOPHIL # BLD AUTO: 0 K/UL — SIGNIFICANT CHANGE UP (ref 0–0.5)
EOSINOPHIL NFR BLD AUTO: 0 % — SIGNIFICANT CHANGE UP (ref 0–6)
GLUCOSE SERPL-MCNC: 120 MG/DL — HIGH (ref 70–99)
HCT VFR BLD CALC: 35.8 % — SIGNIFICANT CHANGE UP (ref 34.5–45)
HGB BLD-MCNC: 11.9 G/DL — SIGNIFICANT CHANGE UP (ref 11.5–15.5)
IMM GRANULOCYTES NFR BLD AUTO: 0.5 % — SIGNIFICANT CHANGE UP (ref 0–1.5)
INR BLD: 1.43 RATIO — HIGH (ref 0.88–1.16)
LYMPHOCYTES # BLD AUTO: 0.88 K/UL — LOW (ref 1–3.3)
LYMPHOCYTES # BLD AUTO: 7 % — LOW (ref 13–44)
MCHC RBC-ENTMCNC: 28.3 PG — SIGNIFICANT CHANGE UP (ref 27–34)
MCHC RBC-ENTMCNC: 33.2 GM/DL — SIGNIFICANT CHANGE UP (ref 32–36)
MCV RBC AUTO: 85.2 FL — SIGNIFICANT CHANGE UP (ref 80–100)
MONOCYTES # BLD AUTO: 0.67 K/UL — SIGNIFICANT CHANGE UP (ref 0–0.9)
MONOCYTES NFR BLD AUTO: 5.3 % — SIGNIFICANT CHANGE UP (ref 2–14)
NEUTROPHILS # BLD AUTO: 10.91 K/UL — HIGH (ref 1.8–7.4)
NEUTROPHILS NFR BLD AUTO: 87.1 % — HIGH (ref 43–77)
PLATELET # BLD AUTO: 465 K/UL — HIGH (ref 150–400)
POTASSIUM SERPL-MCNC: 3.7 MMOL/L — SIGNIFICANT CHANGE UP (ref 3.5–5.3)
POTASSIUM SERPL-SCNC: 3.7 MMOL/L — SIGNIFICANT CHANGE UP (ref 3.5–5.3)
PROT SERPL-MCNC: 5.7 GM/DL — LOW (ref 6–8.3)
PROTHROM AB SERPL-ACNC: 16.4 SEC — HIGH (ref 10.6–13.6)
RBC # BLD: 4.2 M/UL — SIGNIFICANT CHANGE UP (ref 3.8–5.2)
RBC # FLD: 12.6 % — SIGNIFICANT CHANGE UP (ref 10.3–14.5)
SODIUM SERPL-SCNC: 139 MMOL/L — SIGNIFICANT CHANGE UP (ref 135–145)
WBC # BLD: 12.53 K/UL — HIGH (ref 3.8–10.5)
WBC # FLD AUTO: 12.53 K/UL — HIGH (ref 3.8–10.5)

## 2021-09-16 PROCEDURE — 99239 HOSP IP/OBS DSCHRG MGMT >30: CPT

## 2021-09-16 RX ORDER — ASPIRIN/CALCIUM CARB/MAGNESIUM 324 MG
1 TABLET ORAL
Qty: 30 | Refills: 0
Start: 2021-09-16 | End: 2021-10-15

## 2021-09-16 RX ORDER — ACETAMINOPHEN 500 MG
650 TABLET ORAL ONCE
Refills: 0 | Status: COMPLETED | OUTPATIENT
Start: 2021-09-16 | End: 2021-09-16

## 2021-09-16 RX ADMIN — Medication 650 MILLIGRAM(S): at 13:37

## 2021-09-16 RX ADMIN — Medication 12.5 MILLIGRAM(S): at 11:33

## 2021-09-16 RX ADMIN — DORZOLAMIDE HYDROCHLORIDE TIMOLOL MALEATE 1 DROP(S): 20; 5 SOLUTION/ DROPS OPHTHALMIC at 05:43

## 2021-09-16 RX ADMIN — ENOXAPARIN SODIUM 40 MILLIGRAM(S): 100 INJECTION SUBCUTANEOUS at 12:02

## 2021-09-16 RX ADMIN — AMLODIPINE BESYLATE 2.5 MILLIGRAM(S): 2.5 TABLET ORAL at 11:33

## 2021-09-16 RX ADMIN — Medication 88 MICROGRAM(S): at 05:44

## 2021-09-16 RX ADMIN — REMDESIVIR 500 MILLIGRAM(S): 5 INJECTION INTRAVENOUS at 16:45

## 2021-09-16 RX ADMIN — LISINOPRIL 20 MILLIGRAM(S): 2.5 TABLET ORAL at 12:03

## 2021-09-16 RX ADMIN — Medication 6 MILLIGRAM(S): at 05:44

## 2021-09-16 RX ADMIN — CITALOPRAM 40 MILLIGRAM(S): 10 TABLET, FILM COATED ORAL at 11:33

## 2021-09-16 NOTE — DISCHARGE NOTE PROVIDER - NSDCMRMEDTOKEN_GEN_ALL_CORE_FT
Aspirin Enteric Coated 81 mg oral delayed release tablet: 1 tab(s) orally once a day  benzonatate 100 mg oral capsule: 1 cap(s) orally 3 times a day, As Needed  CeleXA 40 mg oral tablet: 1 tab(s) orally once a day  dorzolamide-timolol 2.23%-0.68% ophthalmic solution: 1 drop(s) to each affected eye 2 times a day  latanoprost 0.005% ophthalmic solution: 1 drop(s) to each affected eye once a day (in the evening)  levothyroxine 88 mcg (0.088 mg) oral tablet: 1 tab(s) orally once a day  lisinopril-hydrochlorothiazide 20 mg-12.5 mg oral tablet: 1 tab(s) orally once a day  Norvasc 2.5 mg oral tablet: 1 tab(s) orally once a day  simvastatin 20 mg oral tablet: 1 tab(s) orally once a day (at bedtime)

## 2021-09-16 NOTE — DISCHARGE NOTE PROVIDER - HOSPITAL COURSE
76/F with PMHx of recently positive for COVID 19, bradycardia, hypothyroidism, HLD, HTN, was brought to the ED for c/o weakness  and fatigue x 10 days. Pt Dx with  COVID 10 days ago. No N/V/D. Denies CP or SOB. In ED was hypoxic at 93% ra. She got decadron in ED.   9/16 feel better, weaned off o2 on RA, no sob , ambulates ,no CP eager to go home   Constitutional: Weak appearing  HEENT: Atraumatic, ARIADNE, Normal, No congestion  Respiratory: Breath Sounds normal, no rhonchi/wheeze  Cardiovascular: N S1S2;   Gastrointestinal: Abdomen soft, non tender, Bowel Sounds present  Extremities: No edema, peripheral pulses present  Neurological: AAO x 2, no gross focal motor deficits  Skin: Non cellulitic, no rash, ulcers  Lymph Nodes: No lymphadenopathy noted  Back: No CVA tenderness   Musculoskeletal: non tender    76/F with PMHx of recently positive for COVID 19, bradycardia, hypothyroidism, HLD, HTN, was brought to the ED for c/o weakness  and fatigue x 10 days. Pt Dx with  COVID 10 days prior to admission  No N/V/D. Denies CP or SOB. In ED was hypoxic at 93% ra. She got decadron in ED.     Pt admitted with   COVID 19 PNA with acute hypoxic respiratory failure    treated with 3 days of decadron and remdesevir   weaned off O2   did not qualify for home O2 - respiratory therapist trevor appreciated   D dimer neg   [id number] for extended vte prophylaxis as outpatient for 30 days   medicallys table for discharge     2) Hypokalemia 3.1: replaced,     3) HLD: statin  4) HTN: cont ace i, ccb    5) Hypothyroidism: synthroid 88 mcg    6) DVT PPX: lovenox in patient    discharge time 47mins

## 2021-09-16 NOTE — DISCHARGE NOTE NURSING/CASE MANAGEMENT/SOCIAL WORK - PATIENT PORTAL LINK FT
You can access the FollowMyHealth Patient Portal offered by [facility] by registering at the following website: [email or web address]. By joining [employer]’s FollowMyHealth portal, you will also be able to view your health information using other applications (apps) compatible with our system.

## 2021-09-16 NOTE — DISCHARGE NOTE PROVIDER - NSDCCPCAREPLAN_GEN_ALL_CORE_FT
PRINCIPAL DISCHARGE DIAGNOSIS  Diagnosis: 2019 novel coronavirus disease (COVID-19)  Assessment and Plan of Treatment: please follow with your primary doctor in 1 week or as soon as possible      SECONDARY DISCHARGE DIAGNOSES  Diagnosis: Acute respiratory failure with hypoxia  Assessment and Plan of Treatment:

## 2021-09-20 DIAGNOSIS — E78.5 HYPERLIPIDEMIA, UNSPECIFIED: ICD-10-CM

## 2021-09-20 DIAGNOSIS — R00.1 BRADYCARDIA, UNSPECIFIED: ICD-10-CM

## 2021-09-20 DIAGNOSIS — E87.6 HYPOKALEMIA: ICD-10-CM

## 2021-09-20 DIAGNOSIS — U07.1 COVID-19: ICD-10-CM

## 2021-09-20 DIAGNOSIS — J96.01 ACUTE RESPIRATORY FAILURE WITH HYPOXIA: ICD-10-CM

## 2021-09-20 DIAGNOSIS — E03.9 HYPOTHYROIDISM, UNSPECIFIED: ICD-10-CM

## 2021-09-20 DIAGNOSIS — J12.82 PNEUMONIA DUE TO CORONAVIRUS DISEASE 2019: ICD-10-CM

## 2021-09-20 DIAGNOSIS — I10 ESSENTIAL (PRIMARY) HYPERTENSION: ICD-10-CM

## 2024-07-09 NOTE — PATIENT PROFILE ADULT - HAVE YOU EXPERIENCED VIOLENCE OR A TRAUMATIC EVENT?
GOAL: Pt will improve strength to at least a 3+/5 in order to improve safety with functional mobility in 2 weeks
no

## 2024-10-04 NOTE — DISCHARGE NOTE PROVIDER - DISCHARGE DIET
Normal blood counts. Normal electrolytes, kidney function and liver function. Hbg A1c (3 month average of blood sugar) is 6.1%, now in the prediabetes range. Please work on healthy eating habits + consistent physical activity + weight loss.   
DASH Diet

## 2025-01-10 ENCOUNTER — INPATIENT (INPATIENT)
Facility: HOSPITAL | Age: 80
LOS: 2 days | Discharge: HOME HEALTH | DRG: 310 | End: 2025-01-13
Attending: INTERNAL MEDICINE | Admitting: INTERNAL MEDICINE
Payer: MEDICARE

## 2025-01-10 VITALS — WEIGHT: 144.18 LBS

## 2025-01-10 DIAGNOSIS — I48.91 UNSPECIFIED ATRIAL FIBRILLATION: ICD-10-CM

## 2025-01-10 LAB
ALBUMIN SERPL ELPH-MCNC: 3.2 G/DL — LOW (ref 3.3–5)
ALP SERPL-CCNC: 58 U/L — SIGNIFICANT CHANGE UP (ref 40–120)
ALT FLD-CCNC: 8 U/L — LOW (ref 12–78)
ANION GAP SERPL CALC-SCNC: 8 MMOL/L — SIGNIFICANT CHANGE UP (ref 5–17)
APPEARANCE UR: CLEAR — SIGNIFICANT CHANGE UP
APTT BLD: 34.4 SEC — SIGNIFICANT CHANGE UP (ref 24.5–35.6)
AST SERPL-CCNC: 25 U/L — SIGNIFICANT CHANGE UP (ref 15–37)
BACTERIA # UR AUTO: NEGATIVE /HPF — SIGNIFICANT CHANGE UP
BASOPHILS # BLD AUTO: 0 K/UL — SIGNIFICANT CHANGE UP (ref 0–0.2)
BASOPHILS NFR BLD AUTO: 0 % — SIGNIFICANT CHANGE UP (ref 0–2)
BILIRUB SERPL-MCNC: 0.7 MG/DL — SIGNIFICANT CHANGE UP (ref 0.2–1.2)
BILIRUB UR-MCNC: NEGATIVE — SIGNIFICANT CHANGE UP
BUN SERPL-MCNC: 44 MG/DL — HIGH (ref 7–23)
CALCIUM SERPL-MCNC: 9.6 MG/DL — SIGNIFICANT CHANGE UP (ref 8.5–10.1)
CAST: 50 /LPF — HIGH (ref 0–4)
CHLORIDE SERPL-SCNC: 98 MMOL/L — SIGNIFICANT CHANGE UP (ref 96–108)
CO2 SERPL-SCNC: 26 MMOL/L — SIGNIFICANT CHANGE UP (ref 22–31)
COLOR SPEC: SIGNIFICANT CHANGE UP
CREAT SERPL-MCNC: 1.9 MG/DL — HIGH (ref 0.5–1.3)
DIFF PNL FLD: ABNORMAL
EGFR: 26 ML/MIN/1.73M2 — LOW
EOSINOPHIL # BLD AUTO: 0 K/UL — SIGNIFICANT CHANGE UP (ref 0–0.5)
EOSINOPHIL NFR BLD AUTO: 0 % — SIGNIFICANT CHANGE UP (ref 0–6)
EPI CELLS # UR: PRESENT
FLUAV AG NPH QL: SIGNIFICANT CHANGE UP
FLUBV AG NPH QL: SIGNIFICANT CHANGE UP
GLUCOSE SERPL-MCNC: 109 MG/DL — HIGH (ref 70–99)
GLUCOSE UR QL: NEGATIVE MG/DL — SIGNIFICANT CHANGE UP
HCT VFR BLD CALC: 38.8 % — SIGNIFICANT CHANGE UP (ref 34.5–45)
HGB BLD-MCNC: 12.7 G/DL — SIGNIFICANT CHANGE UP (ref 11.5–15.5)
HYALINE CASTS # UR AUTO: PRESENT
INR BLD: 1.78 RATIO — HIGH (ref 0.85–1.16)
KETONES UR-MCNC: ABNORMAL MG/DL
LEUKOCYTE ESTERASE UR-ACNC: ABNORMAL
LYMPHOCYTES # BLD AUTO: 13 % — SIGNIFICANT CHANGE UP (ref 13–44)
LYMPHOCYTES # BLD AUTO: 2.19 K/UL — SIGNIFICANT CHANGE UP (ref 1–3.3)
MAGNESIUM SERPL-MCNC: 1.8 MG/DL — SIGNIFICANT CHANGE UP (ref 1.6–2.6)
MANUAL SMEAR VERIFICATION: SIGNIFICANT CHANGE UP
MCHC RBC-ENTMCNC: 29 PG — SIGNIFICANT CHANGE UP (ref 27–34)
MCHC RBC-ENTMCNC: 32.7 G/DL — SIGNIFICANT CHANGE UP (ref 32–36)
MCV RBC AUTO: 88.6 FL — SIGNIFICANT CHANGE UP (ref 80–100)
MONOCYTES # BLD AUTO: 1.35 K/UL — HIGH (ref 0–0.9)
MONOCYTES NFR BLD AUTO: 8 % — SIGNIFICANT CHANGE UP (ref 2–14)
NEUTROPHILS # BLD AUTO: 13.3 K/UL — HIGH (ref 1.8–7.4)
NEUTROPHILS NFR BLD AUTO: 79 % — HIGH (ref 43–77)
NITRITE UR-MCNC: NEGATIVE — SIGNIFICANT CHANGE UP
NRBC # BLD: 0 /100 WBCS — SIGNIFICANT CHANGE UP (ref 0–0)
NRBC # BLD: SIGNIFICANT CHANGE UP /100 WBCS (ref 0–0)
PH UR: 5 — SIGNIFICANT CHANGE UP (ref 5–8)
PLAT MORPH BLD: NORMAL — SIGNIFICANT CHANGE UP
PLATELET # BLD AUTO: 354 K/UL — SIGNIFICANT CHANGE UP (ref 150–400)
POTASSIUM SERPL-MCNC: 3.5 MMOL/L — SIGNIFICANT CHANGE UP (ref 3.5–5.3)
POTASSIUM SERPL-SCNC: 3.5 MMOL/L — SIGNIFICANT CHANGE UP (ref 3.5–5.3)
PROT SERPL-MCNC: 7.8 GM/DL — SIGNIFICANT CHANGE UP (ref 6–8.3)
PROT UR-MCNC: 100 MG/DL
PROTHROM AB SERPL-ACNC: 20.9 SEC — HIGH (ref 9.9–13.4)
RBC # BLD: 4.38 M/UL — SIGNIFICANT CHANGE UP (ref 3.8–5.2)
RBC # FLD: 13.6 % — SIGNIFICANT CHANGE UP (ref 10.3–14.5)
RBC BLD AUTO: NORMAL — SIGNIFICANT CHANGE UP
RBC CASTS # UR COMP ASSIST: 7 /HPF — HIGH (ref 0–4)
RSV RNA NPH QL NAA+NON-PROBE: SIGNIFICANT CHANGE UP
SARS-COV-2 RNA SPEC QL NAA+PROBE: SIGNIFICANT CHANGE UP
SODIUM SERPL-SCNC: 132 MMOL/L — LOW (ref 135–145)
SP GR SPEC: 1.02 — SIGNIFICANT CHANGE UP (ref 1–1.03)
SQUAMOUS # UR AUTO: 18 /HPF — HIGH (ref 0–5)
TROPONIN I, HIGH SENSITIVITY RESULT: 33.37 NG/L — SIGNIFICANT CHANGE UP
TROPONIN I, HIGH SENSITIVITY RESULT: 33.58 NG/L — SIGNIFICANT CHANGE UP
TROPONIN I, HIGH SENSITIVITY RESULT: 37.96 NG/L — SIGNIFICANT CHANGE UP
TSH SERPL-MCNC: 2.32 UU/ML — SIGNIFICANT CHANGE UP (ref 0.34–4.82)
UROBILINOGEN FLD QL: 1 MG/DL — SIGNIFICANT CHANGE UP (ref 0.2–1)
WBC # BLD: 16.83 K/UL — HIGH (ref 3.8–10.5)
WBC # FLD AUTO: 16.83 K/UL — HIGH (ref 3.8–10.5)
WBC UR QL: 5 /HPF — SIGNIFICANT CHANGE UP (ref 0–5)

## 2025-01-10 PROCEDURE — 92960 CARDIOVERSION ELECTRIC EXT: CPT

## 2025-01-10 PROCEDURE — 84484 ASSAY OF TROPONIN QUANT: CPT

## 2025-01-10 PROCEDURE — 93306 TTE W/DOPPLER COMPLETE: CPT

## 2025-01-10 PROCEDURE — 93325 DOPPLER ECHO COLOR FLOW MAPG: CPT

## 2025-01-10 PROCEDURE — 93005 ELECTROCARDIOGRAM TRACING: CPT

## 2025-01-10 PROCEDURE — 99497 ADVNCD CARE PLAN 30 MIN: CPT | Mod: 25

## 2025-01-10 PROCEDURE — 83735 ASSAY OF MAGNESIUM: CPT

## 2025-01-10 PROCEDURE — 99222 1ST HOSP IP/OBS MODERATE 55: CPT

## 2025-01-10 PROCEDURE — 85027 COMPLETE CBC AUTOMATED: CPT

## 2025-01-10 PROCEDURE — 93312 ECHO TRANSESOPHAGEAL: CPT

## 2025-01-10 PROCEDURE — 84100 ASSAY OF PHOSPHORUS: CPT

## 2025-01-10 PROCEDURE — 93320 DOPPLER ECHO COMPLETE: CPT

## 2025-01-10 PROCEDURE — 36415 COLL VENOUS BLD VENIPUNCTURE: CPT

## 2025-01-10 PROCEDURE — 84443 ASSAY THYROID STIM HORMONE: CPT

## 2025-01-10 PROCEDURE — 0241U: CPT

## 2025-01-10 PROCEDURE — 71045 X-RAY EXAM CHEST 1 VIEW: CPT | Mod: 26

## 2025-01-10 PROCEDURE — 87086 URINE CULTURE/COLONY COUNT: CPT

## 2025-01-10 PROCEDURE — 80048 BASIC METABOLIC PNL TOTAL CA: CPT

## 2025-01-10 PROCEDURE — 99223 1ST HOSP IP/OBS HIGH 75: CPT | Mod: AI

## 2025-01-10 PROCEDURE — 81001 URINALYSIS AUTO W/SCOPE: CPT

## 2025-01-10 PROCEDURE — 70450 CT HEAD/BRAIN W/O DYE: CPT | Mod: 26

## 2025-01-10 PROCEDURE — 99285 EMERGENCY DEPT VISIT HI MDM: CPT

## 2025-01-10 RX ORDER — METOPROLOL TARTRATE 50 MG
25 TABLET ORAL ONCE
Refills: 0 | Status: COMPLETED | OUTPATIENT
Start: 2025-01-10 | End: 2025-01-10

## 2025-01-10 RX ORDER — SODIUM CHLORIDE 9 MG/ML
1000 INJECTION, SOLUTION INTRAMUSCULAR; INTRAVENOUS; SUBCUTANEOUS
Refills: 0 | Status: DISCONTINUED | OUTPATIENT
Start: 2025-01-10 | End: 2025-01-12

## 2025-01-10 RX ORDER — SODIUM CHLORIDE 9 MG/ML
1000 INJECTION, SOLUTION INTRAMUSCULAR; INTRAVENOUS; SUBCUTANEOUS ONCE
Refills: 0 | Status: COMPLETED | OUTPATIENT
Start: 2025-01-10 | End: 2025-01-10

## 2025-01-10 RX ORDER — METOPROLOL TARTRATE 50 MG
50 TABLET ORAL EVERY 12 HOURS
Refills: 0 | Status: DISCONTINUED | OUTPATIENT
Start: 2025-01-10 | End: 2025-01-13

## 2025-01-10 RX ORDER — APIXABAN 5 MG/1
2.5 TABLET, FILM COATED ORAL EVERY 12 HOURS
Refills: 0 | Status: DISCONTINUED | OUTPATIENT
Start: 2025-01-10 | End: 2025-01-11

## 2025-01-10 RX ORDER — LATANOPROST 50 UG/ML
1 SOLUTION OPHTHALMIC AT BEDTIME
Refills: 0 | Status: DISCONTINUED | OUTPATIENT
Start: 2025-01-10 | End: 2025-01-13

## 2025-01-10 RX ORDER — CITALOPRAM HYDROBROMIDE 40 MG
40 TABLET ORAL DAILY
Refills: 0 | Status: DISCONTINUED | OUTPATIENT
Start: 2025-01-10 | End: 2025-01-13

## 2025-01-10 RX ORDER — ACETAMINOPHEN 80 MG/.8ML
650 SOLUTION/ DROPS ORAL EVERY 6 HOURS
Refills: 0 | Status: DISCONTINUED | OUTPATIENT
Start: 2025-01-10 | End: 2025-01-13

## 2025-01-10 RX ORDER — DORZOLAMIDE/TIMOLOL/PF 2 %-0.5 %
1 DROPS OPHTHALMIC (EYE)
Refills: 0 | Status: DISCONTINUED | OUTPATIENT
Start: 2025-01-10 | End: 2025-01-13

## 2025-01-10 RX ORDER — ATORVASTATIN CALCIUM 40 MG/1
10 TABLET, FILM COATED ORAL AT BEDTIME
Refills: 0 | Status: DISCONTINUED | OUTPATIENT
Start: 2025-01-10 | End: 2025-01-13

## 2025-01-10 RX ORDER — LEVOTHYROXINE SODIUM 175 UG/1
88 TABLET ORAL DAILY
Refills: 0 | Status: DISCONTINUED | OUTPATIENT
Start: 2025-01-10 | End: 2025-01-13

## 2025-01-10 RX ADMIN — Medication 50 MILLIGRAM(S): at 21:08

## 2025-01-10 RX ADMIN — SODIUM CHLORIDE 1000 MILLILITER(S): 9 INJECTION, SOLUTION INTRAMUSCULAR; INTRAVENOUS; SUBCUTANEOUS at 16:20

## 2025-01-10 RX ADMIN — ATORVASTATIN CALCIUM 10 MILLIGRAM(S): 40 TABLET, FILM COATED ORAL at 21:08

## 2025-01-10 RX ADMIN — LATANOPROST 1 DROP(S): 50 SOLUTION OPHTHALMIC at 22:55

## 2025-01-10 RX ADMIN — Medication 25 MILLIGRAM(S): at 16:21

## 2025-01-10 RX ADMIN — ACETAMINOPHEN 650 MILLIGRAM(S): 80 SOLUTION/ DROPS ORAL at 21:08

## 2025-01-10 NOTE — ED ADULT NURSE NOTE - CHIEF COMPLAINT QUOTE
Pt presents to the ED c.o dizziness. Pt reports going to Dr Navarro office today and was diagnosed with new onset of rapid afib in the 140's. Denies chest pain or palpitations.

## 2025-01-10 NOTE — ED ADULT NURSE NOTE - NS ED NURSE PATIENT LEFT UNIT TIME
[facility] Call Center    Phone Message    May a detailed message be left on voicemail: yes     Reason for Call: Other: Patient discharged from [facility] - needs new patient appointment for pulmonary hypertension within 1 month to establish care     Action Taken: Message routed to:  [facility] ([facility]): Cardio/Nicol    Travel Screening: Not Applicable                                                                         17:58

## 2025-01-10 NOTE — H&P ADULT - ASSESSMENT
79/F with PMHx of HTN, HLD, glaucoma, was sent in hy Dr. Brito for new onset A fib + rvr for RUSH and DCCV on 1/13.    Pt states that her stomach was upset for about a week and was having diarrhea which stopped yesterday. She was not eating or drinking enough fluids. She went to see Dr. Mathew yesterday and was found to be in A fib + RVR. She was started on Eliquis and she saw Dr. Brito today who sent her to ED for admission.    Pt denies any cp/sob/n/v. No diarrhea today.    In ED , pt in A fib + rvr    Pt admitted with :    # New Onset A fib + RVR:  admit to tele  seen by EP  start metoprolol 50 mg q 12 hrs  Eliquis 2.5 mg q 12 hrs  iv fluids  serial trops  echo  cardio consult  TSH  RUSH DCCV on 1/13 if still in A fib    # Elevated Cr:  Cr 1.90  likely LASHAY from diarrhea  and being on HCTZ + losartan  hold them  iv fluids  daily BMP  if not improving, then renal eval    # Leukocytosis:  likely from dehydration, stress reaction and recent diarrhea  recheck in am    # Diarrhea: resolved    # HTN: cont BB    # HLD: on statin    # DVT PPX: on Eliquis    POC discussed with patient,  team, EP team.     CODE: FULL Code for now    time spent 80 min + 22  min for GOC = 102 min.

## 2025-01-10 NOTE — CONSULT NOTE ADULT - NS ATTEND AMEND GEN_ALL_CORE FT
79-year-old female with hypertension dyslipidemia gastroenteritis symptoms found to have A-fib RVR new diagnosis patient was admitted to telemetry and is started on metoprolol 50 mg twice daily.  Patient is also noted to have LASHAY possibly dehydration related.  Will plan for RUSH guided cardioversion.  Continue Eliquis    I spent a total of 55 minutes on the encounter, including chart review, evaluating and discussing treatment options with the patient, as well as counseling and coordination as stated above.

## 2025-01-10 NOTE — CONSULT NOTE ADULT - ASSESSMENT
80 yo F with above PMHx presented with newly diagnosed AF w/RVR, pt has been sick with GI symptoms- nausea/diarrhea for last 10 days. Labs showed renal insufficiency with Cr 1.9, leukocytosis WBC 16.  - admit to tele  - maintain K+>4.0, Mg++>2.0, f/u TSH  - start metoprolol 50mg po BID for rate control  - eliquis 2.5mg po BID  - keep NPO post MN on 1/12 for RUSH/DCCV on 1/13 (Monday)  - Further Mx as per primary team  - cardiology eval  Plan d/w pt//hospitalist 80 yo F with above PMHx presented with newly diagnosed AF w/RVR, pt has been sick with GI symptoms- nausea/diarrhea for last 10 days. Labs showed renal insufficiency with Cr 1.9 likely from dehydration, leukocytosis WBC 16.  - admit to tele  - maintain K+>4.0, Mg++>2.0, f/u TSH  - start metoprolol 50mg po BID for rate control  - eliquis 2.5mg po BID  - hydration, f/u renal function  - keep NPO post MN on 1/12 for RUSH/DCCV on 1/13 (Monday)  - Further Mx as per primary team  - cardiology eval  Plan d/w pt//hospitalist

## 2025-01-10 NOTE — ED ADULT TRIAGE NOTE - NS ED NURSE BANDS TYPE
Health Maintenance Due   Topic Date Due   • HIB Vaccine (3 of 3 - PRP-OMP Series) 03/02/2021   • Pneumococcal Vaccine 0-64 (4 of 4) 03/02/2021   • DTaP/Tdap/Td Vaccine (4 - DTaP) 06/02/2021   • Well Child Exam 15 Months  06/02/2021       Patient is due for the topics as listed above and wishes to proceed with them. Orders placed for Immunization(s) Dtap/Tdap/Td, HIB and Pneumococcal and Well Child Exam.         Name band;

## 2025-01-10 NOTE — H&P ADULT - CONVERSATION DETAILS
GOC and advance care planning meeting done with the patient. She wishes to be fully resuscitated and mechanically ventilated if need arises at this time but would like to think about it later on if she would like to change her decision. There are no limitations of any sort in her medical care and management. She is FULL CODE. Additional time spent 22  min.

## 2025-01-10 NOTE — ED PROVIDER NOTE - CLINICAL SUMMARY MEDICAL DECISION MAKING FREE TEXT BOX
78 y/o female w/ PMHx hypothyroidism, HLD, and HTN presents c/o dizziness. Pt reports going to Dr. Sandhu's office today and was diagnosed with new onset of rapid Afib in the 140s. Denies chest pain or palpitations. NKDA. No other complaints at this time.    New onset atrial fibrillation, TBA for TE cardioversion on Monday, and order labs, chest x-ray, EKG. 78 y/o female w/ PMHx hypothyroidism, HLD, and HTN presents c/o dizziness x  Dec 30th. Went to PCP yesterday and was diagnosed with new onset afib and started on eliquis. Also went to Dr. Brito's office today (cardio) who told patient to go to ER for planned RUSH/cardioversion on Monday. Denies chest pain or palpitations. NKDA. No other complaints at this time.    Exam benign - HR low 100s, BP stable    New onset atrial fibrillation, TBA for TE cardioversion on Monday, and order labs, chest x-ray, EKG.

## 2025-01-10 NOTE — ED PROVIDER NOTE - PHYSICAL EXAMINATION
Constitutional: NAD, alert, verbal  HEENT: NCAT, EOMi, PERRL  Cardiac: RRR no MRG  Resp: clear, no wheezing or crackles  GI: ab soft ntnd, no r/g  MSK/Ext: no edema  Neuro: LONG  Skin: No rashes

## 2025-01-10 NOTE — ED ADULT TRIAGE NOTE - CHIEF COMPLAINT QUOTE
Under control  Continue current medication  We will re-evaluate at next office visit  Pt presents to the ED c.o dizziness. Pt reports going to Dr Navarro office today and was diagnosed with new onset of rapid afib in the 140's. Denies chest pain or palpitations.

## 2025-01-10 NOTE — H&P ADULT - NSHPPHYSICALEXAM_GEN_ALL_CORE
PHYSICAL EXAM:    Daily     Daily     Vital Signs Last 24 Hrs  T(C): 37.2 (10 Eligio 2025 15:37), Max: 37.2 (10 Eligio 2025 15:37)  T(F): 99 (10 Eligio 2025 15:37), Max: 99 (10 Eligio 2025 15:37)  HR: 121 (10 Eligio 2025 15:37) (72 - 121)  BP: 134/75 (10 Eligio 2025 15:37) (121/78 - 134/75)  BP(mean): 90 (10 Eligio 2025 15:37) (89 - 90)  RR: 18 (10 Eligio 2025 15:37) (18 - 18)  SpO2: 99% (10 Eligio 2025 15:37) (99% - 100%)    Constitutional: Weak  appearing  HEENT: Atraumatic, ARIADNE, Normal, No congestion  Respiratory: Breath Sounds normal, no rhonchi/wheeze  Cardiovascular: N S1S2;   Gastrointestinal: Abdomen soft, non tender, Bowel Sounds present  Extremities: No edema, peripheral pulses present  Neurological: AAO x 3, no gross focal motor deficits  Skin: Non cellulitic, no rash, ulcers  Lymph Nodes: No lymphadenopathy noted  Back: No CVA tenderness   Musculoskeletal: non tender  Breasts: Deferred  Genitourinary: deferred  Rectal: Deferred

## 2025-01-10 NOTE — ED ADULT NURSE NOTE - OBJECTIVE STATEMENT
Pt. with PMH Hypothyroid, HTN, HLD presents to ED referred by PMD for cardiac eval. Found to have new onset afib.   Assessment:   General: Well appearing, normotensive, afib with RVR in 110s.    Nuro: AOX4, Mood and behavior appropriate to situation.    Skin: dry, warm and intact, normal turgor.    Head & Neck: WDL  CARD:  Denies CP, peripheral pulses equal and palpable, cap refill WDL. No edema noted.   Respiratory: No respiratory distress, unlabored, normal rate and rhythm. Denies SOB, STEWART,   GI: Abdomen soft and nondistended. No rebound tenderness or guarding. denies N/V/D  : No reports of dysuria, frequency or urgency.   Musculoskeletal: Ambulates without assistance, ROM intact.   Safety: Pt. was instructed to call for assistance before ambulating and avoid sudden movements, Bed in low position with side rails up. Undressed and in a hospital gown and Non-slip socks. Placed on tele monitoring.

## 2025-01-10 NOTE — H&P ADULT - HISTORY OF PRESENT ILLNESS
79/F with PMHx of HTN, HLD, glaucoma, was sent in hy Dr. Brito for new onset A fib + rvr for RUSH and DCCV on 1/13.    Pt states that her stomach was upset for about a week and was having diarrhea which stopped yesterday. She was not eating or drinking enough fluids. She went to see Dr. Mathew yesterday and was found to be in A fib + RVR. She was started on Eliquis and she saw Dr. Brito today who sent her to ED for admission.    Pt denies any cp/sob/n/v. No diarrhea today.    In ED , pt in A fib + rvr

## 2025-01-10 NOTE — H&P ADULT - NSHPLABSRESULTS_GEN_ALL_CORE
All Labs/EKG/Radiology/Meds reviewed    Lab Results:  CBC  CBC Full  -  ( 10 Eligio 2025 12:51 )  WBC Count : 16.83 K/uL  RBC Count : 4.38 M/uL  Hemoglobin : 12.7 g/dL  Hematocrit : 38.8 %  Platelet Count - Automated : 354 K/uL  Mean Cell Volume : 88.6 fl  Mean Cell Hemoglobin : 29.0 pg  Mean Cell Hemoglobin Concentration : 32.7 g/dL  Auto Neutrophil # : 13.30 K/uL  Auto Lymphocyte # : 2.19 K/uL  Auto Monocyte # : 1.35 K/uL  Auto Eosinophil # : 0.00 K/uL  Auto Basophil # : 0.00 K/uL  Auto Neutrophil % : 79.0 %  Auto Lymphocyte % : 13.0 %  Auto Monocyte % : 8.0 %  Auto Eosinophil % : 0.0 %  Auto Basophil % : 0.0 %    .		Differential:	[] Automated		[] Manual  Chemistry  01-10    132[L]  |  98  |  44[H]  ----------------------------<  109[H]  3.5   |  26  |  1.90[H]    Ca    9.6      10 Eligio 2025 12:51  Mg     1.8     01-10    TPro  7.8  /  Alb  3.2[L]  /  TBili  0.7  /  DBili  x   /  AST  25  /  ALT  8[L]  /  AlkPhos  58  10    LIVER FUNCTIONS - ( 10 Eligio 2025 12:51 )  Alb: 3.2 g/dL / Pro: 7.8 gm/dL / ALK PHOS: 58 U/L / ALT: 8 U/L / AST: 25 U/L / GGT: x           PT/INR - ( 10 Eligoi 2025 12:51 )   PT: 20.9 sec;   INR: 1.78 ratio         PTT - ( 10 Eligio 2025 12:51 )  PTT:34.4 sec  Urinalysis Basic - ( 10 Eligio 2025 16:25 )    Color: Dark Yellow / Appearance: Clear / S.018 / pH: x  Gluc: x / Ketone: Trace mg/dL  / Bili: Negative / Urobili: 1.0 mg/dL   Blood: x / Protein: 100 mg/dL / Nitrite: Negative   Leuk Esterase: Small / RBC: x / WBC x   Sq Epi: x / Non Sq Epi: x / Bacteria: x    EKG: a fib 124    MICROBIOLOGY/CULTURES:    MEDICATIONS  (STANDING):  apixaban 2.5 milliGRAM(s) Oral every 12 hours  atorvastatin 10 milliGRAM(s) Oral at bedtime  citalopram 40 milliGRAM(s) Oral daily  dorzolamide 2%/timolol 0.5% Ophthalmic Solution 1 Drop(s) Both EYES two times a day  latanoprost 0.005% Ophthalmic Solution 1 Drop(s) Both EYES at bedtime  levothyroxine 88 MICROGram(s) Oral daily  metoprolol tartrate 50 milliGRAM(s) Oral every 12 hours  sodium chloride 0.9%. 1000 milliLiter(s) (75 mL/Hr) IV Continuous <Continuous>    MEDICATIONS  (PRN):  acetaminophen     Tablet .. 650 milliGRAM(s) Oral every 6 hours PRN Mild Pain (1 - 3)

## 2025-01-11 ENCOUNTER — RESULT REVIEW (OUTPATIENT)
Age: 80
End: 2025-01-11

## 2025-01-11 LAB
ANION GAP SERPL CALC-SCNC: 6 MMOL/L — SIGNIFICANT CHANGE UP (ref 5–17)
BUN SERPL-MCNC: 39 MG/DL — HIGH (ref 7–23)
CALCIUM SERPL-MCNC: 9 MG/DL — SIGNIFICANT CHANGE UP (ref 8.5–10.1)
CHLORIDE SERPL-SCNC: 101 MMOL/L — SIGNIFICANT CHANGE UP (ref 96–108)
CO2 SERPL-SCNC: 28 MMOL/L — SIGNIFICANT CHANGE UP (ref 22–31)
CREAT SERPL-MCNC: 1.32 MG/DL — HIGH (ref 0.5–1.3)
CULTURE RESULTS: SIGNIFICANT CHANGE UP
EGFR: 41 ML/MIN/1.73M2 — LOW
GLUCOSE SERPL-MCNC: 102 MG/DL — HIGH (ref 70–99)
HCT VFR BLD CALC: 38.7 % — SIGNIFICANT CHANGE UP (ref 34.5–45)
HGB BLD-MCNC: 12.5 G/DL — SIGNIFICANT CHANGE UP (ref 11.5–15.5)
MAGNESIUM SERPL-MCNC: 1.8 MG/DL — SIGNIFICANT CHANGE UP (ref 1.6–2.6)
MCHC RBC-ENTMCNC: 28.7 PG — SIGNIFICANT CHANGE UP (ref 27–34)
MCHC RBC-ENTMCNC: 32.3 G/DL — SIGNIFICANT CHANGE UP (ref 32–36)
MCV RBC AUTO: 89 FL — SIGNIFICANT CHANGE UP (ref 80–100)
PHOSPHATE SERPL-MCNC: 3.5 MG/DL — SIGNIFICANT CHANGE UP (ref 2.5–4.5)
PLATELET # BLD AUTO: 330 K/UL — SIGNIFICANT CHANGE UP (ref 150–400)
POTASSIUM SERPL-MCNC: 3 MMOL/L — LOW (ref 3.5–5.3)
POTASSIUM SERPL-SCNC: 3 MMOL/L — LOW (ref 3.5–5.3)
RBC # BLD: 4.35 M/UL — SIGNIFICANT CHANGE UP (ref 3.8–5.2)
RBC # FLD: 13.4 % — SIGNIFICANT CHANGE UP (ref 10.3–14.5)
SODIUM SERPL-SCNC: 135 MMOL/L — SIGNIFICANT CHANGE UP (ref 135–145)
SPECIMEN SOURCE: SIGNIFICANT CHANGE UP
TSH SERPL-MCNC: 4.37 UU/ML — SIGNIFICANT CHANGE UP (ref 0.34–4.82)
WBC # BLD: 9.52 K/UL — SIGNIFICANT CHANGE UP (ref 3.8–10.5)
WBC # FLD AUTO: 9.52 K/UL — SIGNIFICANT CHANGE UP (ref 3.8–10.5)

## 2025-01-11 PROCEDURE — 99233 SBSQ HOSP IP/OBS HIGH 50: CPT

## 2025-01-11 PROCEDURE — 93306 TTE W/DOPPLER COMPLETE: CPT | Mod: 26

## 2025-01-11 PROCEDURE — 99232 SBSQ HOSP IP/OBS MODERATE 35: CPT

## 2025-01-11 RX ORDER — MAGNESIUM SULFATE 500 MG/ML
1 INJECTION, SOLUTION INTRAMUSCULAR; INTRAVENOUS ONCE
Refills: 0 | Status: COMPLETED | OUTPATIENT
Start: 2025-01-11 | End: 2025-01-11

## 2025-01-11 RX ORDER — POTASSIUM CHLORIDE 600 MG/1
40 TABLET, FILM COATED, EXTENDED RELEASE ORAL EVERY 4 HOURS
Refills: 0 | Status: COMPLETED | OUTPATIENT
Start: 2025-01-11 | End: 2025-01-11

## 2025-01-11 RX ORDER — APIXABAN 5 MG/1
5 TABLET, FILM COATED ORAL EVERY 12 HOURS
Refills: 0 | Status: DISCONTINUED | OUTPATIENT
Start: 2025-01-11 | End: 2025-01-13

## 2025-01-11 RX ORDER — MAGNESIUM SULFATE 500 MG/ML
2 INJECTION, SOLUTION INTRAMUSCULAR; INTRAVENOUS ONCE
Refills: 0 | Status: COMPLETED | OUTPATIENT
Start: 2025-01-11 | End: 2025-01-11

## 2025-01-11 RX ADMIN — Medication 50 MILLIGRAM(S): at 09:30

## 2025-01-11 RX ADMIN — POTASSIUM CHLORIDE 40 MILLIEQUIVALENT(S): 600 TABLET, FILM COATED, EXTENDED RELEASE ORAL at 11:17

## 2025-01-11 RX ADMIN — MAGNESIUM SULFATE 100 GRAM(S): 500 INJECTION, SOLUTION INTRAMUSCULAR; INTRAVENOUS at 09:29

## 2025-01-11 RX ADMIN — Medication 1 DROP(S): at 18:28

## 2025-01-11 RX ADMIN — LEVOTHYROXINE SODIUM 88 MICROGRAM(S): 175 TABLET ORAL at 06:23

## 2025-01-11 RX ADMIN — Medication 50 MILLIGRAM(S): at 21:09

## 2025-01-11 RX ADMIN — Medication 40 MILLIGRAM(S): at 14:12

## 2025-01-11 RX ADMIN — Medication 1 DROP(S): at 06:23

## 2025-01-11 RX ADMIN — POTASSIUM CHLORIDE 40 MILLIEQUIVALENT(S): 600 TABLET, FILM COATED, EXTENDED RELEASE ORAL at 09:29

## 2025-01-11 RX ADMIN — APIXABAN 5 MILLIGRAM(S): 5 TABLET, FILM COATED ORAL at 21:08

## 2025-01-11 RX ADMIN — LATANOPROST 1 DROP(S): 50 SOLUTION OPHTHALMIC at 21:08

## 2025-01-11 RX ADMIN — ATORVASTATIN CALCIUM 10 MILLIGRAM(S): 40 TABLET, FILM COATED ORAL at 21:09

## 2025-01-11 RX ADMIN — APIXABAN 2.5 MILLIGRAM(S): 5 TABLET, FILM COATED ORAL at 06:23

## 2025-01-11 RX ADMIN — POTASSIUM CHLORIDE 40 MILLIEQUIVALENT(S): 600 TABLET, FILM COATED, EXTENDED RELEASE ORAL at 14:11

## 2025-01-11 NOTE — CONSULT NOTE ADULT - ASSESSMENT
78 yo F with above PMHx presented with newly diagnosed AF w/RVR, pt has been sick with GI symptoms- nausea/diarrhea for last 10 days. Labs showed renal insufficiency with Cr 1.9 likely from dehydration, leukocytosis WBC 16.  - admit to tele  - maintain K+>4.0, Mg++>2.0, f/u TSH  - start metoprolol 50mg po BID for rate control  - eliquis now 5 mg po BID as creatinine improved  - hydration, f/u renal function - improved 1.3  - keep NPO post MN on 1/12 for RUSH/DCCV on 1/13 (Monday)  - Further Mx as per primary team

## 2025-01-11 NOTE — PROGRESS NOTE ADULT - SUBJECTIVE AND OBJECTIVE BOX
ELECTROPHYSIOLOGY  PROGRESS NOTE    Reason for follow up: New onset AF   Overnight: No new events. Remains in AF with moderate rate control 80-110s As high as 120s Avg HR 80s  Update: TTE being done-will check results. D/W pt AF and importance of DOAC for stroke prevention, as well as need to restore NSR with RUSH/DCCV Mon am    Subjective: "  __________I'm ok____________"    General: No fatigue, no fevers/chills  Respiratory: No dyspnea, no cough, no wheeze  CV: No chest pain, no palpitations  Abd: No nausea  Neuro: No headache, no dizziness  	  Vitals:  T(C): 36.7 (01-11-25 @ 07:37), Max: 37.2 (01-10-25 @ 15:37)  HR: 50 (01-11-25 @ 07:37) (50 - 121)  BP: 124/92 (01-11-25 @ 07:37) (107/69 - 134/75)  RR: 18 (01-11-25 @ 07:37) (18 - 18)  SpO2: 96% (01-11-25 @ 07:37) (96% - 100%)  Wt(kg): --  I&O's Summary    Weight (kg): 65.4 (01-10 @ 11:38)      PHYSICAL EXAM:  Appearance: Comfortable. No acute distress  HEENT:  Head and neck: Atraumatic. Normocephalic.  Normal oral mucosa, PERRL, Neck is supple. No JVD, No carotid bruit.   Neurologic: A & O x 3, no focal deficits. EOMI.  Lymphatic: No cervical lymphadenopathy  Cardiovascular: Normal S1 S2, No murmur, rubs/gallops. No JVD, No edema  Respiratory: Lungs clear to auscultation  Gastrointestinal:  Soft, Non-tender, + BS  Lower Extremities: No edema  Psychiatry: Patient is calm. No agitation. Mood & affect appropriate  Skin: No rashes/ ecchymoses/cyanosis/ulcers visualized on the face, hands or feet.      CURRENT MEDICATIONS:  metoprolol tartrate 50 milliGRAM(s) Oral every 12 hours    citalopram  atorvastatin  levothyroxine  apixaban  dorzolamide 2%/timolol 0.5% Ophthalmic Solution  latanoprost 0.005% Ophthalmic Solution  potassium chloride    Tablet ER  sodium chloride 0.9%.      DIAGNOSTIC TESTING:  [ ] Echocardiogram: TBD  	      LABS:	 	                            12.5   9.52  )-----------( 330      ( 11 Jan 2025 06:52 )             38.7     01-11    135  |  101  |  39[H]  ----------------------------<  102[H]  3.0[L]   |  28  |  1.32[H]    Ca    9.0      11 Jan 2025 06:52  Phos  3.5     01-11  Mg     1.8     01-11    TPro  7.8  /  Alb  3.2[L]  /  TBili  0.7  /  DBili  x   /  AST  25  /  ALT  8[L]  /  AlkPhos  58  01-10      TSH: Thyroid Stimulating Hormone, Serum: 4.37 uU/mL  Thyroid Stimulating Hormone, Serum: 2.32 uU/mL        TELEMETRY: Reviewed  as above  ECG:  Reviewed by me. 	  Ventricular Rate 124 BPM    Atrial Rate 375 BPM    QRS Duration 70 ms    Q-T Interval 356 ms    QTC Calculation(Bazett) 511 ms    R Axis 65 degrees    T Axis 231 degrees    Diagnosis Line Atrial fibrillation with rapid ventricular response  Anteroseptal infarct (cited on or before 10-ASIYA-2025)  Nonspecific ST and T wave abnormality  Abnormal ECG  Confirmed by JAG RIOS MD [id number]) on 1/10/2025 3:15:07 PM

## 2025-01-11 NOTE — PATIENT PROFILE ADULT - FALL HARM RISK - HARM RISK INTERVENTIONS

## 2025-01-11 NOTE — CONSULT NOTE ADULT - SUBJECTIVE AND OBJECTIVE BOX
Cardiology Consultation      HPI:  HPI: 78 yo F h/o HTN, HLD, hypothyroidism has been sick with GI symptoms (+)diarrhea/nausea since Dec 30, 24 also some cough, not feeling right, some dizziness, went to see PMD yesterday found to have new onset AF, started on eliquis/metoprolol and referred to cardiologist () today,  Pt was in AF w/RVR , sent to ER for further work-up.  Pt had no prior cardiac Hx, physically active at home.  Pt is resting in the bed, denies chest pain/SOB/palpitations  tele: AFib up to 150's, now 100' after metoprolol 25mg po given  EKG: AFIb 124bpm, QRS 70ms, QT 356ms/QTC 511ms    PAST MEDICAL & SURGICAL HISTORY:  Hypertension      Hyperlipemia      Hypothyroid      Bradycardia      No significant past surgical history          Allergies    No Known Allergies    Intolerances        SOCIAL HISTORY: Denies tobacco, etoh abuse or illicit drug use    FAMILY HISTORY:  FH: HTN (hypertension) (Father)        MEDICATIONS  (STANDING):  apixaban 5 milliGRAM(s) Oral every 12 hours  atorvastatin 10 milliGRAM(s) Oral at bedtime  citalopram 40 milliGRAM(s) Oral daily  dorzolamide 2%/timolol 0.5% Ophthalmic Solution 1 Drop(s) Both EYES two times a day  latanoprost 0.005% Ophthalmic Solution 1 Drop(s) Both EYES at bedtime  levothyroxine 88 MICROGram(s) Oral daily  metoprolol tartrate 50 milliGRAM(s) Oral every 12 hours  potassium chloride    Tablet ER 40 milliEquivalent(s) Oral every 4 hours  sodium chloride 0.9%. 1000 milliLiter(s) (75 mL/Hr) IV Continuous <Continuous>    MEDICATIONS  (PRN):  acetaminophen     Tablet .. 650 milliGRAM(s) Oral every 6 hours PRN Mild Pain (1 - 3)      Vital Signs Last 24 Hrs  T(C): 36.7 (11 Jan 2025 07:37), Max: 37.2 (10 Eligio 2025 15:37)  T(F): 98.1 (11 Jan 2025 07:37), Max: 99 (10 Eligio 2025 15:37)  HR: 50 (11 Jan 2025 07:37) (50 - 121)  BP: 124/92 (11 Jan 2025 07:37) (107/69 - 134/75)  BP(mean): 107 (10 Eligio 2025 20:36) (89 - 107)  RR: 18 (11 Jan 2025 07:37) (18 - 18)  SpO2: 96% (11 Jan 2025 07:37) (96% - 100%)    Parameters below as of 11 Jan 2025 07:37  Patient On (Oxygen Delivery Method): room air        REVIEW OF SYSTEMS:    CONSTITUTIONAL:  As per HPI.  HEENT:  Eyes:  No diplopia or blurred vision. ENT:  No earache, sore throat or runny nose.  CARDIOVASCULAR:  No pressure, squeezing, strangling, tightness, heaviness or aching about the chest, neck, axilla or epigastrium.  RESPIRATORY:  No cough, shortness of breath, PND or orthopnea.  GASTROINTESTINAL:  No further diarrhea  GENITOURINARY:  No dysuria, frequency or urgency.  MUSCULOSKELETAL:  As per HPI.  SKIN:  No change in skin, hair or nails.  NEUROLOGIC:  No paresthesias, fasciculations, seizures or weakness.  PSYCHIATRIC:  No disorder of thought or mood.  ENDOCRINE:  No heat or cold intolerance, polyuria or polydipsia.  HEMATOLOGICAL:  No easy bruising or bleedings.     PHYSICAL EXAMINATION:    GENERAL APPEARANCE:  Pt. is not currently dyspneic, in no distress. Pt. is alert, oriented, and pleasant.  HEENT:  Pupils are normal and react normally. No icterus. Mucous membranes well colored.  NECK:  Supple. No lymphadenopathy. Jugular venous pressure not elevated. Carotids equal.   HEART:   The cardiac impulse has a normal quality.Irreg irreg  CHEST:  Chest is clear to auscultation. Normal respiratory effort.  ABDOMEN:  Soft and nontender.   EXTREMITIES:  There is no edema.   SKIN:  No rash or significant lesions are noted.    I&O's Summary      LABS:                        12.5   9.52  )-----------( 330      ( 11 Jan 2025 06:52 )             38.7     01-11    135  |  101  |  39[H]  ----------------------------<  102[H]  3.0[L]   |  28  |  1.32[H]    Ca    9.0      11 Jan 2025 06:52  Phos  3.5     01-11  Mg     1.8     01-11    TPro  7.8  /  Alb  3.2[L]  /  TBili  0.7  /  DBili  x   /  AST  25  /  ALT  8[L]  /  AlkPhos  58  01-10    LIVER FUNCTIONS - ( 10 Eligio 2025 12:51 )  Alb: 3.2 g/dL / Pro: 7.8 gm/dL / ALK PHOS: 58 U/L / ALT: 8 U/L / AST: 25 U/L / GGT: x           PT/INR - ( 10 Eligio 2025 12:51 )   PT: 20.9 sec;   INR: 1.78 ratio         PTT - ( 10 Eligio 2025 12:51 )  PTT:34.4 sec      Urinalysis Basic - ( 11 Jan 2025 06:52 )    Color: x / Appearance: x / SG: x / pH: x  Gluc: 102 mg/dL / Ketone: x  / Bili: x / Urobili: x   Blood: x / Protein: x / Nitrite: x   Leuk Esterase: x / RBC: x / WBC x   Sq Epi: x / Non Sq Epi: x / Bacteria: x        EKG: AF    TELEMETRY: AF    CARDIAC TESTS: normal LVEF    RADIOLOGY & ADDITIONAL STUDIES:    ASSESSMENT & PLAN:
HPI: 78 yo F h/o HTN, HLD, hypothyroidism has been sick with GI symptoms (+)diarrhea/nausea since Dec 30, 24 also some cough, not feeling right, some dizziness, went to see PMD yesterday found to have new onset AF, started on eliquis/metoprolol and referred to cardiologist () today,  Pt was in AF w/RVR , sent to ER for further work-up.  Pt had no prior cardiac Hx, physically active at home.  Pt is resting in the bed, denies chest pain/SOB/palpitations, last diarrhea was yesterday AM.  tele: AFib upto 150's, now 100' after metoprolol 25mg po given  EKG: AFIb 124bpm, QRS 70ms, QT 356ms/QTC 511ms    PAST MEDICAL & SURGICAL HISTORY:  Hypertension      Hyperlipemia      Hypothyroid      Bradycardia      No significant past surgical history      FAMILY HISTORY: father (+)VHD, HTN, mother (+) CMP, HTN, sister (+)AFib, CVA,       SOCIAL HISTORY: lives with daughter at home    Allergies    No Known Allergies      MEDICATIONS  (PRN):  acetaminophen     Tablet .. 650 milliGRAM(s) Oral every 6 hours PRN Mild Pain (1 - 3)    ROS: All other ROS is negative unless indicated above.    Physical Exam:  Vital Signs Last 24 Hrs  T(C): 37.2 (10 Eligio 2025 15:37), Max: 37.2 (10 Eligio 2025 15:37)  T(F): 99 (10 Eligio 2025 15:37), Max: 99 (10 Eligio 2025 15:37)  HR: 121 (10 Eligio 2025 15:37) (72 - 121)  BP: 134/75 (10 Eligio 2025 15:37) (121/78 - 134/75)  BP(mean): 90 (10 Eligio 2025 15:37) (89 - 90)  RR: 18 (10 Eligio 2025 15:37) (18 - 18)  SpO2: 99% (10 Eligio 2025 15:37) (99% - 100%)    Parameters below as of 10 Eligio 2025 15:37  Patient On (Oxygen Delivery Method): room air      Constitutional: well developed,  no deformities and no acute distress    Neurological: Alert & Oriented x 3, LONG, no focal deficits    HEENT: NC/AT, PERRLA, EOMI,  Neck supple.    Respiratory: (+)Rt basilar crackles    Cardiovascular: (+)irregular  S1 & S2,     Gastrointestinal: soft, NT, nondistended, (+) BS    Genitourinary: non distended bladder, voiding freely    Extremities: No pedal edema, No clubbing, No cyanosis    Skin:  normal skin color and pigmentation, no skin lesions          LABS:                        12.7   16.83 )-----------( 354      ( 10 Eligio 2025 12:51 )             38.8     01-10    132[L]  |  98  |  44[H]  ----------------------------<  109[H]  3.5   |  26  |  1.90[H]    Ca    9.6      10 Eligio 2025 12:51  Mg     1.8     01-10    TPro  7.8  /  Alb  3.2[L]  /  TBili  0.7  /  DBili  x   /  AST  25  /  ALT  8[L]  /  AlkPhos  58  01-10    PT/INR - ( 10 Eligio 2025 12:51 )   PT: 20.9 sec;   INR: 1.78 ratio         PTT - ( 10 Eligio 2025 12:51 )  PTT:34.4 sec  Urinalysis Basic - ( 10 Eligio 2025 16:25 )    Color: Dark Yellow / Appearance: Clear / S.018 / pH: x  Gluc: x / Ketone: Trace mg/dL  / Bili: Negative / Urobili: 1.0 mg/dL   Blood: x / Protein: 100 mg/dL / Nitrite: Negative   Leuk Esterase: Small / RBC: x / WBC x   Sq Epi: x / Non Sq Epi: x / Bacteria: x

## 2025-01-11 NOTE — PROGRESS NOTE ADULT - SUBJECTIVE AND OBJECTIVE BOX
: no cp/sob  no complaints  still in A fib       PHYSICAL EXAM:    Daily     Daily Weight in k.6 (2025 05:00)    Vital Signs Last 24 Hrs  T(C): 36.7 (2025 07:37), Max: 37.2 (10 Eligio 2025 15:37)  T(F): 98.1 (2025 07:37), Max: 99 (10 Eligio 2025 15:37)  HR: 50 (2025 07:37) (50 - 121)  BP: 124/92 (2025 07:37) (107/69 - 134/75)  BP(mean): 107 (10 Eligio 2025 20:36) (89 - 107)  RR: 18 (2025 07:37) (18 - 18)  SpO2: 96% (2025 07:37) (96% - 100%)    Constitutional: Weak  appearing  HEENT: Atraumatic, ARIADNE,   Respiratory: Breath Sounds normal, no rhonchi/wheeze  Cardiovascular: N S1S2;   Gastrointestinal: Abdomen soft, non tender, Bowel Sounds present  Extremities: No edema, peripheral pulses present  Neurological: AAO x 3, no gross focal motor deficits  Skin: Non cellulitic, no rash, ulcers  Lymph Nodes: No lymphadenopathy noted  Back: No CVA tenderness   Musculoskeletal: non tender  Breasts: Deferred  Genitourinary: deferred  Rectal: Deferred    All Labs/EKG/Radiology/Meds reviewed by me                          12.5   9.52  )-----------( 330      ( 2025 06:52 )             38.7       CBC Full  -  ( 2025 06:52 )  WBC Count : 9.52 K/uL  RBC Count : 4.35 M/uL  Hemoglobin : 12.5 g/dL  Hematocrit : 38.7 %  Platelet Count - Automated : 330 K/uL  Mean Cell Volume : 89.0 fl  Mean Cell Hemoglobin : 28.7 pg  Mean Cell Hemoglobin Concentration : 32.3 g/dL  Auto Neutrophil # : x  Auto Lymphocyte # : x  Auto Monocyte # : x  Auto Eosinophil # : x  Auto Basophil # : x  Auto Neutrophil % : x  Auto Lymphocyte % : x  Auto Monocyte % : x  Auto Eosinophil % : x  Auto Basophil % : x      01-11    135  |  101  |  39[H]  ----------------------------<  102[H]  3.0[L]   |  28  |  1.32[H]    Ca    9.0      2025 06:52  Phos  3.5       Mg     1.8         TPro  7.8  /  Alb  3.2[L]  /  TBili  0.7  /  DBili  x   /  AST  25  /  ALT  8[L]  /  AlkPhos  58  01-10      LIVER FUNCTIONS - ( 10 Eligio 2025 12:51 )  Alb: 3.2 g/dL / Pro: 7.8 gm/dL / ALK PHOS: 58 U/L / ALT: 8 U/L / AST: 25 U/L / GGT: x             PT/INR - ( 10 Eligio 2025 12:51 )   PT: 20.9 sec;   INR: 1.78 ratio         PTT - ( 10 Eligio 2025 12:51 )  PTT:34.4 sec          Urinalysis Basic - ( 2025 06:52 )    Color: x / Appearance: x / SG: x / pH: x  Gluc: 102 mg/dL / Ketone: x  / Bili: x / Urobili: x   Blood: x / Protein: x / Nitrite: x   Leuk Esterase: x / RBC: x / WBC x   Sq Epi: x / Non Sq Epi: x / Bacteria: x      < from: CT Head No Cont (01.10.25 @ 14:39) >  IMPRESSION: Stable noncontrast head CT.    < end of copied text >    < from: Xray Chest 1 View- PORTABLE-Urgent (01.10.25 @ 13:00) >  IMPRESSION:  No active parenchymal disease in the chest.    < end of copied text >        MEDICATIONS  (STANDING):  apixaban 5 milliGRAM(s) Oral every 12 hours  atorvastatin 10 milliGRAM(s) Oral at bedtime  citalopram 40 milliGRAM(s) Oral daily  dorzolamide 2%/timolol 0.5% Ophthalmic Solution 1 Drop(s) Both EYES two times a day  latanoprost 0.005% Ophthalmic Solution 1 Drop(s) Both EYES at bedtime  levothyroxine 88 MICROGram(s) Oral daily  metoprolol tartrate 50 milliGRAM(s) Oral every 12 hours  potassium chloride    Tablet ER 40 milliEquivalent(s) Oral every 4 hours  sodium chloride 0.9%. 1000 milliLiter(s) (75 mL/Hr) IV Continuous <Continuous>    MEDICATIONS  (PRN):  acetaminophen     Tablet .. 650 milliGRAM(s) Oral every 6 hours PRN Mild Pain (1 - 3)

## 2025-01-12 LAB
ANION GAP SERPL CALC-SCNC: 3 MMOL/L — LOW (ref 5–17)
BUN SERPL-MCNC: 24 MG/DL — HIGH (ref 7–23)
CALCIUM SERPL-MCNC: 9.1 MG/DL — SIGNIFICANT CHANGE UP (ref 8.5–10.1)
CHLORIDE SERPL-SCNC: 109 MMOL/L — HIGH (ref 96–108)
CO2 SERPL-SCNC: 25 MMOL/L — SIGNIFICANT CHANGE UP (ref 22–31)
CREAT SERPL-MCNC: 0.87 MG/DL — SIGNIFICANT CHANGE UP (ref 0.5–1.3)
EGFR: 67 ML/MIN/1.73M2 — SIGNIFICANT CHANGE UP
GLUCOSE SERPL-MCNC: 109 MG/DL — HIGH (ref 70–99)
HCT VFR BLD CALC: 36.8 % — SIGNIFICANT CHANGE UP (ref 34.5–45)
HGB BLD-MCNC: 11.8 G/DL — SIGNIFICANT CHANGE UP (ref 11.5–15.5)
MAGNESIUM SERPL-MCNC: 1.8 MG/DL — SIGNIFICANT CHANGE UP (ref 1.6–2.6)
MCHC RBC-ENTMCNC: 29.1 PG — SIGNIFICANT CHANGE UP (ref 27–34)
MCHC RBC-ENTMCNC: 32.1 G/DL — SIGNIFICANT CHANGE UP (ref 32–36)
MCV RBC AUTO: 90.9 FL — SIGNIFICANT CHANGE UP (ref 80–100)
PLATELET # BLD AUTO: 337 K/UL — SIGNIFICANT CHANGE UP (ref 150–400)
POTASSIUM SERPL-MCNC: 4.4 MMOL/L — SIGNIFICANT CHANGE UP (ref 3.5–5.3)
POTASSIUM SERPL-SCNC: 4.4 MMOL/L — SIGNIFICANT CHANGE UP (ref 3.5–5.3)
RBC # BLD: 4.05 M/UL — SIGNIFICANT CHANGE UP (ref 3.8–5.2)
RBC # FLD: 13.6 % — SIGNIFICANT CHANGE UP (ref 10.3–14.5)
SODIUM SERPL-SCNC: 137 MMOL/L — SIGNIFICANT CHANGE UP (ref 135–145)
WBC # BLD: 8.59 K/UL — SIGNIFICANT CHANGE UP (ref 3.8–10.5)
WBC # FLD AUTO: 8.59 K/UL — SIGNIFICANT CHANGE UP (ref 3.8–10.5)

## 2025-01-12 PROCEDURE — 99232 SBSQ HOSP IP/OBS MODERATE 35: CPT

## 2025-01-12 RX ORDER — MAGNESIUM SULFATE 500 MG/ML
1 INJECTION, SOLUTION INTRAMUSCULAR; INTRAVENOUS ONCE
Refills: 0 | Status: COMPLETED | OUTPATIENT
Start: 2025-01-12 | End: 2025-01-12

## 2025-01-12 RX ADMIN — Medication 1 DROP(S): at 17:16

## 2025-01-12 RX ADMIN — Medication 1 DROP(S): at 06:12

## 2025-01-12 RX ADMIN — Medication 50 MILLIGRAM(S): at 21:57

## 2025-01-12 RX ADMIN — LEVOTHYROXINE SODIUM 88 MICROGRAM(S): 175 TABLET ORAL at 06:12

## 2025-01-12 RX ADMIN — Medication 50 MILLIGRAM(S): at 09:23

## 2025-01-12 RX ADMIN — ATORVASTATIN CALCIUM 10 MILLIGRAM(S): 40 TABLET, FILM COATED ORAL at 21:57

## 2025-01-12 RX ADMIN — APIXABAN 5 MILLIGRAM(S): 5 TABLET, FILM COATED ORAL at 21:56

## 2025-01-12 RX ADMIN — MAGNESIUM SULFATE 100 GRAM(S): 500 INJECTION, SOLUTION INTRAMUSCULAR; INTRAVENOUS at 14:07

## 2025-01-12 RX ADMIN — Medication 40 MILLIGRAM(S): at 14:06

## 2025-01-12 RX ADMIN — APIXABAN 5 MILLIGRAM(S): 5 TABLET, FILM COATED ORAL at 09:20

## 2025-01-12 RX ADMIN — LATANOPROST 1 DROP(S): 50 SOLUTION OPHTHALMIC at 21:57

## 2025-01-12 NOTE — PROGRESS NOTE ADULT - SUBJECTIVE AND OBJECTIVE BOX
: no cp/sob  no complaints  still in A fib     : remains in a fib  no complaints  Cr normalized; 0.87  d/c iv fluids    PHYSICAL EXAM:    Daily     Daily Weight in k.6 (2025 05:00)    Vital Signs Last 24 Hrs  T(C): 36.8 (2025 08:24), Max: 37.1 (2025 00:35)  T(F): 98.2 (2025 08:24), Max: 98.7 (2025 00:35)  HR: 67 (2025 08:24) (67 - 94)  BP: 155/80 (2025 08:24) (118/76 - 155/80)  BP(mean): 99 (2025 21:07) (99 - 99)  RR: 18 (2025 08:24) (18 - 18)  SpO2: 100% (2025 08:24) (96% - 100%)    Parameters below as of 2025 08:24  Patient On (Oxygen Delivery Method): room air        Constitutional: Weak  appearing  HEENT: Atraumatic, ARIADNE,   Respiratory: Breath Sounds normal, no rhonchi/wheeze  Cardiovascular: N S1S2;   Gastrointestinal: Abdomen soft, non tender, Bowel Sounds present  Extremities: No edema, peripheral pulses present  Neurological: AAO x 3, no gross focal motor deficits  Skin: Non cellulitic, no rash, ulcers  Lymph Nodes: No lymphadenopathy noted  Back: No CVA tenderness   Musculoskeletal: non tender  Breasts: Deferred  Genitourinary: deferred  Rectal: Deferred    All Labs/EKG/Radiology/Meds reviewed    Lab Results:  CBC  CBC Full  -  ( 2025 06:25 )  WBC Count : 8.59 K/uL  RBC Count : 4.05 M/uL  Hemoglobin : 11.8 g/dL  Hematocrit : 36.8 %  Platelet Count - Automated : 337 K/uL  Mean Cell Volume : 90.9 fl  Mean Cell Hemoglobin : 29.1 pg  Mean Cell Hemoglobin Concentration : 32.1 g/dL  Auto Neutrophil # : x  Auto Lymphocyte # : x  Auto Monocyte # : x  Auto Eosinophil # : x  Auto Basophil # : x  Auto Neutrophil % : x  Auto Lymphocyte % : x  Auto Monocyte % : x  Auto Eosinophil % : x  Auto Basophil % : x    .		Differential:	[] Automated		[] Manual  Chemistry      137  |  109[H]  |  24[H]  ----------------------------<  109[H]  4.4   |  25  |  0.87    Ca    9.1      2025 06:25  Phos  3.5     11  Mg     1.8         TPro  7.8  /  Alb  3.2[L]  /  TBili  0.7  /  DBili  x   /  AST  25  /  ALT  8[L]  /  AlkPhos  58  01-10    LIVER FUNCTIONS - ( 10 Eligio 2025 12:51 )  Alb: 3.2 g/dL / Pro: 7.8 gm/dL / ALK PHOS: 58 U/L / ALT: 8 U/L / AST: 25 U/L / GGT: x           PT/INR - ( 10 Eligio 2025 12:51 )   PT: 20.9 sec;   INR: 1.78 ratio         PTT - ( 10 Eligio 2025 12:51 )  PTT:34.4 sec  Urinalysis Basic - ( 2025 06:25 )    Color: x / Appearance: x / SG: x / pH: x  Gluc: 109 mg/dL / Ketone: x  / Bili: x / Urobili: x   Blood: x / Protein: x / Nitrite: x   Leuk Esterase: x / RBC: x / WBC x   Sq Epi: x / Non Sq Epi: x / Bacteria: x        MICROBIOLOGY/CULTURES:  Culture Results:   >=3 organisms. Probable collection contamination. (01-10 @ 16:25)            < from: CT Head No Cont (01.10.25 @ 14:39) >  IMPRESSION: Stable noncontrast head CT.    < end of copied text >    < from: Xray Chest 1 View- PORTABLE-Urgent (01.10.25 @ 13:00) >  IMPRESSION:  No active parenchymal disease in the chest.    < end of copied text >        MEDICATIONS  (STANDING):  apixaban 5 milliGRAM(s) Oral every 12 hours  atorvastatin 10 milliGRAM(s) Oral at bedtime  citalopram 40 milliGRAM(s) Oral daily  dorzolamide 2%/timolol 0.5% Ophthalmic Solution 1 Drop(s) Both EYES two times a day  latanoprost 0.005% Ophthalmic Solution 1 Drop(s) Both EYES at bedtime  levothyroxine 88 MICROGram(s) Oral daily  metoprolol tartrate 50 milliGRAM(s) Oral every 12 hours      MEDICATIONS  (PRN):  acetaminophen     Tablet .. 650 milliGRAM(s) Oral every 6 hours PRN Mild Pain (1 - 3)

## 2025-01-12 NOTE — PROGRESS NOTE ADULT - SUBJECTIVE AND OBJECTIVE BOX
Patient is a 79y old  Female who presents with a chief complaint of new A fib + rvr (12 Jan 2025 10:09)        HPI:  79/F with PMHx of HTN, HLD, glaucoma, was sent in hy Dr. Brito for new onset A fib + rvr for RUSH and DCCV on 1/13.    Pt states that her stomach was upset for about a week and was having diarrhea which stopped yesterday. She was not eating or drinking enough fluids. She went to see Dr. Mathew yesterday and was found to be in A fib + RVR. She was started on Eliquis and she saw Dr. Brito today who sent her to ED for admission.    Pt denies any cp/sob/n/v. No diarrhea today.    In ED , pt in A fib + rvr    Feels good  Creatinine has come down to .8      Echo-    1. Left ventricular systolic function is normal with an ejection fraction visually estimated at 55 to 60 %.   2. Left atrium is moderately dilated.   3. Mild mitral regurgitation.   4. Mild tricuspid regurgitation.   5. Mild aortic regurgitation.   6. Mild pulmonic regurgitation.   7. There is mild calcification of the mitral valve annulus.        PAST MEDICAL & SURGICAL HISTORY:  Hypertension      Hyperlipemia      Hypothyroid      Bradycardia      No significant past surgical history            FAMILY HISTORY:  FH: HTN (hypertension) (Father)          Allergies    No Known Allergies    Intolerances          MEDICATIONS  (STANDING):  apixaban 5 milliGRAM(s) Oral every 12 hours  atorvastatin 10 milliGRAM(s) Oral at bedtime  citalopram 40 milliGRAM(s) Oral daily  dorzolamide 2%/timolol 0.5% Ophthalmic Solution 1 Drop(s) Both EYES two times a day  latanoprost 0.005% Ophthalmic Solution 1 Drop(s) Both EYES at bedtime  levothyroxine 88 MICROGram(s) Oral daily  magnesium sulfate  IVPB 1 Gram(s) IV Intermittent once  metoprolol tartrate 50 milliGRAM(s) Oral every 12 hours  sodium chloride 0.9%. 1000 milliLiter(s) (75 mL/Hr) IV Continuous <Continuous>    MEDICATIONS  (PRN):  acetaminophen     Tablet .. 650 milliGRAM(s) Oral every 6 hours PRN Mild Pain (1 - 3)        REVIEW OF SYSTEMS:    CONSTITUTIONAL: No weakness, fevers or chills, lightheadedness/dizziness  EYES/ENT: No visual changes;  No vertigo or throat pain   NECK: No pain or stiffness  RESPIRATORY: No cough, wheezing, hemoptysis; No shortness of breath  CARDIOVASCULAR: No chest pain or palpitations  GASTROINTESTINAL: No abdominal or epigastric pain. No nausea, vomiting, or hematemesis; No diarrhea or constipation. No melena or hematochezia.  GENITOURINARY: No dysuria, frequency or hematuria  NEUROLOGICAL: No numbness or weakness  SKIN: No itching, rashes        Vital Signs Last 24 Hrs  T(C): 36.8 (12 Jan 2025 08:24), Max: 37.1 (12 Jan 2025 00:35)  T(F): 98.2 (12 Jan 2025 08:24), Max: 98.7 (12 Jan 2025 00:35)  HR: 67 (12 Jan 2025 08:24) (67 - 94)  BP: 155/80 (12 Jan 2025 08:24) (118/76 - 155/80)  BP(mean): 99 (11 Jan 2025 21:07) (99 - 99)  RR: 18 (12 Jan 2025 08:24) (18 - 18)  SpO2: 100% (12 Jan 2025 08:24) (96% - 100%)    Parameters below as of 12 Jan 2025 08:24  Patient On (Oxygen Delivery Method): room air          I&O's Summary        PHYSICAL EXAM:  GENERAL: NAD, well-groomed, well-developed,  HEENT - NC/AT, pupils equal and reactive to light,  ; Moist mucous membranes, Good dentition, No lesions  NECK: Supple, No JVD  CHEST/LUNG: Clear to auscultation bilaterally; No rales, rhonchi, wheezing  HEART: iregular rate and rhythm; No murmurs, rubs, or gallops  ABDOMEN: Soft, Nontender, Nondistended; Bowel sounds present  EXTREMITIES:  2+ Peripheral Pulses, No clubbing, cyanosis, or edema  NEURO:  No Focal deficits, sensory and motor intact                                  11.8   8.59  )-----------( 337      ( 12 Jan 2025 06:25 )             36.8     01-12    137  |  109[H]  |  24[H]  ----------------------------<  109[H]  4.4   |  25  |  0.87    Ca    9.1      12 Jan 2025 06:25  Phos  3.5     01-11  Mg     1.8     01-12    TPro  7.8  /  Alb  3.2[L]  /  TBili  0.7  /  DBili  x   /  AST  25  /  ALT  8[L]  /  AlkPhos  58  01-10    LIVER FUNCTIONS - ( 10 Eligio 2025 12:51 )  Alb: 3.2 g/dL / Pro: 7.8 gm/dL / ALK PHOS: 58 U/L / ALT: 8 U/L / AST: 25 U/L / GGT: x             Culture - Urine (collected 10 Eligio 2025 16:25)  Source: .Urine None  Final Report (11 Jan 2025 20:41):    >=3 organisms. Probable collection contamination.    Urinalysis with Rflx Culture (collected 10 Eligio 2025 16:25)      PT/INR - ( 10 Eligio 2025 12:51 )   PT: 20.9 sec;   INR: 1.78 ratio         PTT - ( 10 Eligio 2025 12:51 )  PTT:34.4 sec  Urinalysis Basic - ( 12 Jan 2025 06:25 )    Color: x / Appearance: x / SG: x / pH: x  Gluc: 109 mg/dL / Ketone: x  / Bili: x / Urobili: x   Blood: x / Protein: x / Nitrite: x   Leuk Esterase: x / RBC: x / WBC x   Sq Epi: x / Non Sq Epi: x / Bacteria: x

## 2025-01-12 NOTE — PROGRESS NOTE ADULT - SUBJECTIVE AND OBJECTIVE BOX
ELECTROPHYSIOLOGY   PROGRESS NOTE    Reason for follow up:  New onset AF RVR  Overnight: No new events. Remains in AF moderately rate controlled   Update: Plan for RUSH/DCCV in am/ pt educated about AF & procedure    Subjective: "  ______I'm a bit nervous________________"    General: No fatigue, no fevers/chills  Respiratory: No dyspnea, no cough, no wheeze  CV: No chest pain, no palpitations  Abd: No nausea  Neuro: No headache, no dizziness  	  Vitals:  T(C): 36.8 (01-12-25 @ 08:24), Max: 37.1 (01-12-25 @ 00:35)  HR: 67 (01-12-25 @ 08:24) (67 - 94)  BP: 155/80 (01-12-25 @ 08:24) (118/76 - 155/80)  RR: 18 (01-12-25 @ 08:24) (18 - 18)  SpO2: 100% (01-12-25 @ 08:24) (96% - 100%)  Wt(kg): --  I&O's Summary    Weight (kg): 65.4 (01-10 @ 11:38)      PHYSICAL EXAM:  Appearance: Comfortable. No acute distress  HEENT:  Head and neck: Atraumatic. Normocephalic.  Normal oral mucosa, PERRL, Neck is supple. No JVD, No carotid bruit.   Neurologic: A & O x 3, no focal deficits. EOMI.  Lymphatic: No cervical lymphadenopathy  Cardiovascular: Normal S1 S2, No murmur, rubs/gallops. No JVD, No edema  Respiratory: Lungs clear to auscultation  Gastrointestinal:  Soft, Non-tender, + BS  Lower Extremities: No edema  Psychiatry: Patient is calm. No agitation. Mood & affect appropriate  Skin: No rashes/ ecchymoses/cyanosis/ulcers visualized on the face, hands or feet.      CURRENT MEDICATIONS:  metoprolol tartrate 50 milliGRAM(s) Oral every 12 hours    citalopram  atorvastatin  levothyroxine  apixaban  dorzolamide 2%/timolol 0.5% Ophthalmic Solution  latanoprost 0.005% Ophthalmic Solution  sodium chloride 0.9%.      DIAGNOSTIC TESTING:  TRANSTHORACIC ECHOCARDIOGRAM REPORT  ________________________________________________________________________________                                      _______       Pt. Name:       MINGO CANO Study Date:    1/11/2025  MRN:            [id number]    YOB: 1945  Accession #:    [id number]    Age:           79 years  Account#:       [id number] Gender:        F  Heart Rate:                  Height:        64.00 in (162.56 cm)  Rhythm:                      Weight:        144.00 lb(65.32 kg)  Blood Pressure: 134/75 mmHg  BSA/BMI:       1.70 m² / 24.72 kg/m²  ________________________________________________________________________________________  Referring Physician:    [id number] Marco Antonio Alvarez  Interpreting Physician: Estevan Carney MD  Primary Sonographer:    Victoria Neff RDCS    CPT:               ECHO TTE WO CON COMP W DOPP - 46709.m  Indication(s):     Cardiac murmur, unspecified - R01.1  Procedure:         Transthoracic echocardiogram with 2-D, M-mode and complete                    spectral and color flow Doppler.  Ordering Location: ED  Admission Status:  Inpatient  Study Information: Image quality for this study is adequate.    _______________________________________________________________________________________     CONCLUSIONS:      1. Left ventricular systolic function is normal with an ejection fraction visually estimated at 55 to 60 %.   2. Left atrium is moderately dilated.   3. Mild mitral regurgitation.   4. Mild tricuspid regurgitation.   5. Mild aortic regurgitation.   6. Mild pulmonic regurgitation.   7. There is mild calcification of the mitral valve annulus.        LABS:	 	                            11.8   8.59  )-----------( 337      ( 12 Jan 2025 06:25 )             36.8     01-12    137  |  109[H]  |  24[H]  ----------------------------<  109[H]  4.4   |  25  |  0.87    Ca    9.1      12 Jan 2025 06:25  Phos  3.5     01-11  Mg     1.8     01-12    TPro  7.8  /  Alb  3.2[L]  /  TBili  0.7  /  DBili  x   /  AST  25  /  ALT  8[L]  /  AlkPhos  58  01-10    proBNP:   Lipid Profile:   HgA1c:   TSH: Thyroid Stimulating Hormone, Serum: 4.37 uU/mL  Thyroid Stimulating Hormone, Serum: 2.32 uU/mL        TELEMETRY: Reviewed  as above  ECG:  Reviewed by me. 	  Ventricular Rate 124 BPM    Atrial Rate 375 BPM    QRS Duration 70 ms    Q-T Interval 356 ms    QTC Calculation(Bazett) 511 ms    R Axis 65 degrees    T Axis 231 degrees    Diagnosis Line Atrial fibrillation with rapid ventricular response  Anteroseptal infarct (cited on or before 10-ASIYA-2025)  Nonspecific ST and T wave abnormality  Abnormal ECG  Confirmed by JAG RIOS MD ([id number]) on 1/10/2025 3:15:07 PM

## 2025-01-13 ENCOUNTER — TRANSCRIPTION ENCOUNTER (OUTPATIENT)
Age: 80
End: 2025-01-13

## 2025-01-13 ENCOUNTER — RESULT REVIEW (OUTPATIENT)
Age: 80
End: 2025-01-13

## 2025-01-13 VITALS
OXYGEN SATURATION: 100 % | HEART RATE: 71 BPM | SYSTOLIC BLOOD PRESSURE: 156 MMHG | RESPIRATION RATE: 18 BRPM | TEMPERATURE: 98 F | DIASTOLIC BLOOD PRESSURE: 77 MMHG

## 2025-01-13 DIAGNOSIS — I48.19 OTHER PERSISTENT ATRIAL FIBRILLATION: ICD-10-CM

## 2025-01-13 LAB
ANION GAP SERPL CALC-SCNC: 5 MMOL/L — SIGNIFICANT CHANGE UP (ref 5–17)
BUN SERPL-MCNC: 16 MG/DL — SIGNIFICANT CHANGE UP (ref 7–23)
CALCIUM SERPL-MCNC: 9.4 MG/DL — SIGNIFICANT CHANGE UP (ref 8.5–10.1)
CHLORIDE SERPL-SCNC: 109 MMOL/L — HIGH (ref 96–108)
CO2 SERPL-SCNC: 25 MMOL/L — SIGNIFICANT CHANGE UP (ref 22–31)
CREAT SERPL-MCNC: 0.69 MG/DL — SIGNIFICANT CHANGE UP (ref 0.5–1.3)
EGFR: 87 ML/MIN/1.73M2 — SIGNIFICANT CHANGE UP
GLUCOSE SERPL-MCNC: 112 MG/DL — HIGH (ref 70–99)
HCT VFR BLD CALC: 35.2 % — SIGNIFICANT CHANGE UP (ref 34.5–45)
HGB BLD-MCNC: 11.2 G/DL — LOW (ref 11.5–15.5)
MAGNESIUM SERPL-MCNC: 1.5 MG/DL — LOW (ref 1.6–2.6)
MAGNESIUM SERPL-MCNC: 2.2 MG/DL — SIGNIFICANT CHANGE UP (ref 1.6–2.6)
MCHC RBC-ENTMCNC: 28.9 PG — SIGNIFICANT CHANGE UP (ref 27–34)
MCHC RBC-ENTMCNC: 31.8 G/DL — LOW (ref 32–36)
MCV RBC AUTO: 91 FL — SIGNIFICANT CHANGE UP (ref 80–100)
PLATELET # BLD AUTO: 343 K/UL — SIGNIFICANT CHANGE UP (ref 150–400)
POTASSIUM SERPL-MCNC: 3.8 MMOL/L — SIGNIFICANT CHANGE UP (ref 3.5–5.3)
POTASSIUM SERPL-SCNC: 3.8 MMOL/L — SIGNIFICANT CHANGE UP (ref 3.5–5.3)
RBC # BLD: 3.87 M/UL — SIGNIFICANT CHANGE UP (ref 3.8–5.2)
RBC # FLD: 13.5 % — SIGNIFICANT CHANGE UP (ref 10.3–14.5)
SODIUM SERPL-SCNC: 139 MMOL/L — SIGNIFICANT CHANGE UP (ref 135–145)
WBC # BLD: 8.38 K/UL — SIGNIFICANT CHANGE UP (ref 3.8–10.5)
WBC # FLD AUTO: 8.38 K/UL — SIGNIFICANT CHANGE UP (ref 3.8–10.5)

## 2025-01-13 PROCEDURE — 99239 HOSP IP/OBS DSCHRG MGMT >30: CPT

## 2025-01-13 PROCEDURE — 93325 DOPPLER ECHO COLOR FLOW MAPG: CPT | Mod: 26

## 2025-01-13 PROCEDURE — 93312 ECHO TRANSESOPHAGEAL: CPT | Mod: 26

## 2025-01-13 PROCEDURE — 93320 DOPPLER ECHO COMPLETE: CPT | Mod: 26

## 2025-01-13 PROCEDURE — 99232 SBSQ HOSP IP/OBS MODERATE 35: CPT

## 2025-01-13 PROCEDURE — 93010 ELECTROCARDIOGRAM REPORT: CPT

## 2025-01-13 RX ORDER — APIXABAN 5 MG/1
1 TABLET, FILM COATED ORAL
Refills: 0 | DISCHARGE

## 2025-01-13 RX ORDER — CITALOPRAM HYDROBROMIDE 40 MG
20 TABLET ORAL DAILY
Refills: 0 | Status: DISCONTINUED | OUTPATIENT
Start: 2025-01-14 | End: 2025-01-13

## 2025-01-13 RX ORDER — CITALOPRAM HYDROBROMIDE 40 MG
1 TABLET ORAL
Qty: 0 | Refills: 0 | DISCHARGE
Start: 2025-01-13

## 2025-01-13 RX ORDER — LOSARTAN POTASSIUM AND HYDROCHLOROTHIAZIDE 100; 25 MG/1; MG/1
1 TABLET, FILM COATED ORAL
Refills: 0 | DISCHARGE

## 2025-01-13 RX ORDER — APIXABAN 5 MG/1
1 TABLET, FILM COATED ORAL
Qty: 60 | Refills: 0
Start: 2025-01-13 | End: 2025-02-11

## 2025-01-13 RX ORDER — METOPROLOL TARTRATE 50 MG
1 TABLET ORAL
Qty: 60 | Refills: 0
Start: 2025-01-13 | End: 2025-02-11

## 2025-01-13 RX ORDER — MAGNESIUM SULFATE 500 MG/ML
2 INJECTION, SOLUTION INTRAMUSCULAR; INTRAVENOUS ONCE
Refills: 0 | Status: COMPLETED | OUTPATIENT
Start: 2025-01-13 | End: 2025-01-13

## 2025-01-13 RX ADMIN — Medication 1 DROP(S): at 17:57

## 2025-01-13 RX ADMIN — Medication 50 MILLIGRAM(S): at 10:43

## 2025-01-13 RX ADMIN — Medication 1 DROP(S): at 06:23

## 2025-01-13 RX ADMIN — Medication 40 MILLIGRAM(S): at 10:43

## 2025-01-13 RX ADMIN — APIXABAN 5 MILLIGRAM(S): 5 TABLET, FILM COATED ORAL at 10:43

## 2025-01-13 RX ADMIN — LEVOTHYROXINE SODIUM 88 MICROGRAM(S): 175 TABLET ORAL at 06:22

## 2025-01-13 RX ADMIN — MAGNESIUM SULFATE 25 GRAM(S): 500 INJECTION, SOLUTION INTRAMUSCULAR; INTRAVENOUS at 10:44

## 2025-01-13 NOTE — DISCHARGE NOTE NURSING/CASE MANAGEMENT/SOCIAL WORK - PATIENT PORTAL LINK FT
You can access the FollowMyHealth Patient Portal offered by [facility] by registering at the following website: [email or web address]. By joining [employer]’s FollowMyHealth portal, you will also be able to view your health information using other applications (apps) compatible with our system.

## 2025-01-13 NOTE — DISCHARGE NOTE PROVIDER - HOSPITAL COURSE
PHYSICAL EXAM:    Daily Height in cm: 167.64 (13 Jan 2025 12:32)    Daily     Vital Signs Last 24 Hrs  T(C): 36.8 (13 Jan 2025 14:11), Max: 37 (13 Jan 2025 12:34)  T(F): 98.2 (13 Jan 2025 14:11), Max: 98.6 (13 Jan 2025 12:34)  HR: 68 (13 Jan 2025 14:11) (61 - 87)  BP: 148/84 (13 Jan 2025 14:11) (107/62 - 163/90)  BP(mean): 111 (13 Jan 2025 00:20) (111 - 111)  RR: 18 (13 Jan 2025 14:11) (16 - 18)  SpO2: 100% (13 Jan 2025 14:11) (100% - 100%)    Constitutional: Well  appearing  HEENT: Atraumatic, ARIADNE, Normal, No congestion  Respiratory: Breath Sounds normal, no rhonchi/wheeze  Cardiovascular: N S1S2;   Gastrointestinal: Abdomen soft, non tender, Bowel Sounds present  Extremities: No edema, peripheral pulses present  Neurological: AAO x 3, no gross focal motor deficits  Skin: Non cellulitic, no rash, ulcers  Lymph Nodes: No lymphadenopathy noted  Back: No CVA tenderness   Musculoskeletal: non tender  Breasts: Deferred  Genitourinary: deferred  Rectal: Deferred    79/F with PMHx of HTN, HLD, glaucoma, was sent in hy Dr. Brito for new onset A fib + rvr for RUSH and DCCV on 1/13.    Pt states that her stomach was upset for about a week and was having diarrhea which stopped yesterday. She was not eating or drinking enough fluids. She went to see Dr. Mathew yesterday and was found to be in A fib + RVR. She was started on Eliquis and she saw Dr. Brito today who sent her to ED for admission.    Pt denies any cp/sob/n/v. No diarrhea today.    In ED , pt in A fib + rvr    Pt admitted with :    # New Onset A fib + RVR:  admit to tele  start metoprolol 50 mg q 12 hrs; better rate on it  Eliquis 2.5 mg q 12 hrs ; now increased to 5 mg q 12 hrs a s Cr improved to 1.32  iv fluids done; d/scott  serial trops; neg  echo: EF 55-60%; Left atrium is moderately dilated.  cardio consult appreciated  TSH; normal  s/p RUSH DCCV on 1/13 ;     # Elevated Cr:  LASHAY  Cr 1.90; now 0.87  LASHAY from diarrhea  and being on HCTZ + losartan  hold them  iv fluids given  daily BMP  Cr improved to 1.32; 0.87; 0.69  BMP in am  Keep K 4, Mg 2  iv Mg given for mag of 1.5; rechecked 2.2    # Leukocytosis: resolved  likely from dehydration, stress reaction and recent diarrhea  recheck in am; WBC normalized    # Hypomagnesemia: Mg 1.5  replace, recheck   # Diarrhea: resolved    # Hypokalemia : K 3.0; replaced; 4.4    # HTN: cont BB    # HLD: on statin    POC discussed with patient,  team.     CODE: FULL Code for now    d/c home    time spent 45 min

## 2025-01-13 NOTE — PHARMACOTHERAPY INTERVENTION NOTE - COMMENTS
Medication history complete, reviewed medications with patient and confirmed with doctor first med hx.
patient on celexa 40mg QD - in patient's over 6-, max dose of celexa recommended is 20mg due to risk of QTc prolongation, on admission QTc 511. spoke with provider, reduced dose to 20mg QD.

## 2025-01-13 NOTE — PROGRESS NOTE ADULT - SUBJECTIVE AND OBJECTIVE BOX
: no cp/sob  no complaints  still in A fib     : remains in a fib  no complaints  Cr normalized; 0.87  d/c iv fluids    : still in A fib  plan for RUSH DCCV today  Mg 1.5; replaced    PHYSICAL EXAM:    Daily     Daily Weight in k.6 (2025 05:00)    Vital Signs Last 24 Hrs  T(C): 36.3 (2025 09:11), Max: 36.8 (2025 15:49)  T(F): 97.3 (2025 09:11), Max: 98.2 (2025 15:49)  HR: 69 (2025 09:11) (54 - 81)  BP: 114/58 (2025 09:11) (107/62 - 147/71)  BP(mean): 111 (2025 00:20) (111 - 111)  RR: 18 (2025 09:11) (18 - 18)  SpO2: 100% (2025 09:11) (99% - 100%)    Parameters below as of 2025 09:11  Patient On (Oxygen Delivery Method): room air      Constitutional: Well  appearing  HEENT: Atraumatic, ARIADNE,   Respiratory: Breath Sounds normal, no rhonchi/wheeze  Cardiovascular: N S1S2;   Gastrointestinal: Abdomen soft, non tender, Bowel Sounds present  Extremities: No edema, peripheral pulses present  Neurological: AAO x 3, no gross focal motor deficits  Skin: Non cellulitic, no rash, ulcers  Lymph Nodes: No lymphadenopathy noted  Back: No CVA tenderness   Musculoskeletal: non tender  Breasts: Deferred  Genitourinary: deferred  Rectal: Deferred    All Labs/EKG/Radiology/Meds reviewed    Lab Results:  CBC  CBC Full  -  ( 2025 06:22 )  WBC Count : 8.38 K/uL  RBC Count : 3.87 M/uL  Hemoglobin : 11.2 g/dL  Hematocrit : 35.2 %  Platelet Count - Automated : 343 K/uL  Mean Cell Volume : 91.0 fl  Mean Cell Hemoglobin : 28.9 pg  Mean Cell Hemoglobin Concentration : 31.8 g/dL  Auto Neutrophil # : x  Auto Lymphocyte # : x  Auto Monocyte # : x  Auto Eosinophil # : x  Auto Basophil # : x  Auto Neutrophil % : x  Auto Lymphocyte % : x  Auto Monocyte % : x  Auto Eosinophil % : x  Auto Basophil % : x    .		Differential:	[] Automated		[] Manual  Chemistry      139  |  109[H]  |  16  ----------------------------<  112[H]  3.8   |  25  |  0.69    Ca    9.4      2025 06:22  Mg     1.5               Urinalysis Basic - ( 2025 06:22 )    Color: x / Appearance: x / SG: x / pH: x  Gluc: 112 mg/dL / Ketone: x  / Bili: x / Urobili: x   Blood: x / Protein: x / Nitrite: x   Leuk Esterase: x / RBC: x / WBC x   Sq Epi: x / Non Sq Epi: x / Bacteria: x        MICROBIOLOGY/CULTURES:  Culture Results:   >=3 organisms. Probable collection contamination. (01-10 @ 16:25)            < from: CT Head No Cont (01.10.25 @ 14:39) >  IMPRESSION: Stable noncontrast head CT.    < end of copied text >    < from: Xray Chest 1 View- PORTABLE-Urgent (01.10.25 @ 13:00) >  IMPRESSION:  No active parenchymal disease in the chest.    < end of copied text >      MEDICATIONS  (STANDING):  apixaban 5 milliGRAM(s) Oral every 12 hours  atorvastatin 10 milliGRAM(s) Oral at bedtime  citalopram 40 milliGRAM(s) Oral daily  dorzolamide 2%/timolol 0.5% Ophthalmic Solution 1 Drop(s) Both EYES two times a day  latanoprost 0.005% Ophthalmic Solution 1 Drop(s) Both EYES at bedtime  levothyroxine 88 MICROGram(s) Oral daily  metoprolol tartrate 50 milliGRAM(s) Oral every 12 hours    MEDICATIONS  (PRN):  acetaminophen     Tablet .. 650 milliGRAM(s) Oral every 6 hours PRN Mild Pain (1 - 3)

## 2025-01-13 NOTE — PACU DISCHARGE NOTE - COMMENTS
Report given to Elli cotton on 3east.  Pt is alert and oriented x 3 with stable vital signs, hr 61 and sinus rhythm on the monitor.  Denies pain or discomfort.  Tele tech verified ekg rhythm.  Pt transported to 3e via stretcher

## 2025-01-13 NOTE — DISCHARGE NOTE PROVIDER - NSDCCPCAREPLAN_GEN_ALL_CORE_FT
PRINCIPAL DISCHARGE DIAGNOSIS  Diagnosis: Atrial fibrillation  Assessment and Plan of Treatment: new onset  s/p cardioversion  continue metoprolol, eliquis  f/u with Dr. Brito in 1 week

## 2025-01-13 NOTE — PROCEDURAL SAFETY CHECKLIST WITH OR WITHOUT SEDATION - NSPOSTCOMMENTFT_GEN_ALL_CORE
Bedside RUSH/CV performed. All safety equipment in place and ready at bedside. Brief/Time out performed at bedside with all team members present @ 13:02  Probe in @13:04  probe out @ 1313. CVV performed at @ (200J) administered, (1)shock given, pt now in (sinus bradycardia).  Will continue to monitor.

## 2025-01-13 NOTE — PROGRESS NOTE ADULT - PROVIDER SPECIALTY LIST ADULT
Electrophysiology
Hospitalist
Cardiology
Electrophysiology
Electrophysiology
Hospitalist
Hospitalist
Cardiology

## 2025-01-13 NOTE — PROGRESS NOTE ADULT - NS ATTEND AMEND GEN_ALL_CORE FT
Agree with above.
79-year-old female with hypertension dyslipidemia gastroenteritis symptoms found to have A-fib RVR new diagnosis patient was admitted to telemetry and is started on metoprolol 50 mg twice daily.  Patient is also noted to have LASHAY related to dehydration sCr improved to nml    hypomagnesemia repleated, keep Mg>2, K>4  s/p RUSH guided DCCV to SR. mod to sev MR, mod TR, nml lvef, LAE severe  Will plan for RUSH guided cardioversion.  Continue Eliquis 5mg bid  follow with cardiology outpatient     I spent a total of 35 minutes on the encounter, including chart review, evaluating and discussing treatment options with the patient, as well as counseling and coordination as stated above.

## 2025-01-13 NOTE — DISCHARGE NOTE NURSING/CASE MANAGEMENT/SOCIAL WORK - FINANCIAL ASSISTANCE
[facility] provides services at a reduced cost to those who are determined to be eligible through [facility]’s financial assistance program. Information regarding [facility]’s financial assistance program can be found by going to [email or web address] or by calling [phone].

## 2025-01-13 NOTE — PACU DISCHARGE NOTE - HYDRATION STATUS:
Patient called left a  to refill Enora Ellipta Inhaler send to [facility].   NPO till 15:30

## 2025-01-13 NOTE — PROGRESS NOTE ADULT - SUBJECTIVE AND OBJECTIVE BOX
CC:  Patient is a 79y old  Female who presents with a chief complaint of new A fib + rvr (12 Jan 2025 10:09)    FROM H&P: 79/F with PMHx of HTN, HLD, glaucoma, was sent in hy Dr. Brito for new onset A fib + rvr for RUSH and DCCV on 1/13.  Pt states that her stomach was upset for about a week and was having diarrhea which stopped yesterday. She was not eating or drinking enough fluids. She went to see Dr. Mathew yesterday and was found to be in A fib + RVR. She was started on Eliquis and she saw Dr. Brito today who sent her to ED for admission.  Pt denies any cp/sob/n/v. No diarrhea today.  In ED , pt in A fib + rvr  Feels good  Creatinine has come down to .8    1/13: Awaiting RUSH/CV, no cp or sob  AF rate controlled on tele      PAST MEDICAL & SURGICAL HISTORY:  Hypertension  Hyperlipemia  Hypothyroid  Bradycardia  No significant past surgical history    FAMILY HISTORY:  FH: HTN (hypertension) (Father)    Allergies  No Known Allergies  Intolerances    MEDICATIONS  (STANDING):  apixaban 5 milliGRAM(s) Oral every 12 hours  atorvastatin 10 milliGRAM(s) Oral at bedtime  citalopram 40 milliGRAM(s) Oral daily  dorzolamide 2%/timolol 0.5% Ophthalmic Solution 1 Drop(s) Both EYES two times a day  latanoprost 0.005% Ophthalmic Solution 1 Drop(s) Both EYES at bedtime  levothyroxine 88 MICROGram(s) Oral daily  magnesium sulfate  IVPB 1 Gram(s) IV Intermittent once  metoprolol tartrate 50 milliGRAM(s) Oral every 12 hours  sodium chloride 0.9%. 1000 milliLiter(s) (75 mL/Hr) IV Continuous <Continuous>    MEDICATIONS  (PRN):  acetaminophen     Tablet .. 650 milliGRAM(s) Oral every 6 hours PRN Mild Pain (1 - 3)    HOME MEDICATIONS:  CeleXA 40 mg oral tablet: 1 tab(s) orally once a day (10 Eligio 2025 15:57)  dorzolamide-timolol 2.23%-0.68% ophthalmic solution: 1 drop(s) to each affected eye 2 times a day (10 Eligio 2025 15:57)  Eliquis 5 mg oral tablet: 1 tab(s) orally once a day (10 Eligio 2025 15:55)  latanoprost 0.005% ophthalmic solution: 1 drop(s) to each affected eye once a day (in the evening) (10 Eligio 2025 15:57)  levothyroxine 88 mcg (0.088 mg) oral tablet: 1 tab(s) orally once a day (10 Eligio 2025 15:57)  losartan-hydroCHLOROthiazide 100 mg-25 mg oral tablet: 1 tab(s) orally once a day (10 Eligio 2025 15:58)  simvastatin 20 mg oral tablet: 1 tab(s) orally once a day (at bedtime) (10 Eligio 2025 15:57)    PHYSICAL EXAM:  T(C): 36.3 (13 Jan 2025 09:11), Max: 36.8 (12 Jan 2025 15:49)  T(F): 97.3 (13 Jan 2025 09:11), Max: 98.2 (12 Jan 2025 15:49)  HR: 69 (13 Jan 2025 09:11) (54 - 81)  BP: 114/58 (13 Jan 2025 09:11) (107/62 - 147/71)  BP(mean): 111 (13 Jan 2025 00:20) (111 - 111)  RR: 18 (13 Jan 2025 09:11) (18 - 18)  SpO2: 100% (13 Jan 2025 09:11) (99% - 100%)    Parameters below as of 13 Jan 2025 09:11  Patient On (Oxygen Delivery Method): room air    GENERAL: NAD, well-groomed, well-developed,  HEENT - NC/AT, pupils equal and reactive to light,  ; Moist mucous membranes, Good dentition, No lesions  NECK: Supple, No JVD  CHEST/LUNG: Clear to auscultation bilaterally; No rales, rhonchi, wheezing  HEART: iregular rate and rhythm; No murmurs, rubs, or gallops  ABDOMEN: Soft, Nontender, Nondistended; Bowel sounds present  EXTREMITIES:  2+ Peripheral Pulses, No clubbing, cyanosis, or edema  NEURO:  No Focal deficits, sensory and motor intact      LABS: All Labs Reviewed:                        11.2   8.38  )-----------( 343      ( 13 Jan 2025 06:22 )             35.2     01-13    139  |  109[H]  |  16  ----------------------------<  112[H]  3.8   |  25  |  0.69    Ca    9.4      13 Jan 2025 06:22  Mg     1.5     01-13    Troponin: 33.58 ng/L, Troponin: 37.96 ng/L, Troponin: 33.37 ng/L      ECG:   Ventricular Rate 124 BPM    Atrial Rate 375 BPM    QRS Duration 70 ms    Q-T Interval 356 ms    QTC Calculation(Bazett) 511 ms    R Axis 65 degrees    T Axis 231 degrees    Diagnosis Line Atrial fibrillation with rapid ventricular response  Anteroseptal infarct (cited on or before 10-ELIGIO-2025)  Nonspecific ST and T wave abnormality  Abnormal ECG  Confirmed by JAG RIOS MD ([id number]) on 1/10/2025 3:15:07 PM    CARDIAC TESTING:    Echo-  1. Left ventricular systolic function is normal with an ejection fraction visually estimated at 55 to 60 %.   2. Left atrium is moderately dilated.   3. Mild mitral regurgitation.   4. Mild tricuspid regurgitation.   5. Mild aortic regurgitation.   6. Mild pulmonic regurgitation.   7. There is mild calcification of the mitral valve annulus.    RADIOLOGY:    < from: CT Head No Cont (01.10.25 @ 14:39) >  IMPRESSION: Stable noncontrast head CT.    < end of copied text >

## 2025-01-13 NOTE — PROGRESS NOTE ADULT - REASON FOR ADMISSION
new A fib + rvr

## 2025-01-13 NOTE — PROGRESS NOTE ADULT - SUBJECTIVE AND OBJECTIVE BOX
HPI: 80 yo F h/o HTN, HLD, hypothyroidism has been sick with GI symptoms (+)diarrhea/nausea since Dec 30, 24 also some cough, not feeling right, some dizziness, went to see PMD yesterday found to have new onset AF, started on eliquis/metoprolol and referred to cardiologist () today,  Pt was in AF w/RVR , sent to ER for further work-up.  Pt had no prior cardiac Hx, physically active at home.    1/10/25: Pt is resting in the bed, denies chest pain/SOB/palpitations, last diarrhea was yesterday AM.  tele: AFib upto 150's, now 100' after metoprolol 25mg po given  EKG: AFIb 124bpm, QRS 70ms, QT 356ms/QTC 511ms    1/13/25: s/p RUSH/DCCV today to NSR 60's bpm  Pt denies GI symptoms, tolerating po intake, states feeling better  EKG: SR 59 bpm, [place] 188ms, QRS 74 ms, QTC 449ms      ROS: All other ROS is negative unless indicated above.      Physical Exam:  Vital Signs Last 24 Hrs  T(C): 36.8 (13 Jan 2025 14:11), Max: 37 (13 Jan 2025 12:34)  T(F): 98.2 (13 Jan 2025 14:11), Max: 98.6 (13 Jan 2025 12:34)  HR: 68 (13 Jan 2025 14:11) (54 - 87)  BP: 148/84 (13 Jan 2025 14:11) (107/62 - 163/90)  BP(mean): 111 (13 Jan 2025 00:20) (111 - 111)  RR: 18 (13 Jan 2025 14:11) (16 - 18)  SpO2: 100% (13 Jan 2025 14:11) (99% - 100%)    Parameters below as of 13 Jan 2025 14:11  Patient On (Oxygen Delivery Method): room air      Constitutional: well developed,  no deformities and no acute distress    Neurological: Alert & Oriented x 3, LONG, no focal deficits    HEENT: NC/AT, PERRLA, EOMI,  Neck supple.    Respiratory: CTA B/L, No wheezing/crackles/rhonchi    Cardiovascular: (+) S1 & S2, RRR, No m/r/g    Gastrointestinal: soft, NT, nondistended, (+) BS    Genitourinary: non distended bladder, voiding freely    Extremities: No pedal edema, No clubbing, No cyanosis    Skin:  normal skin color and pigmentation, no skin lesions            Allergies    No Known Allergies    Intolerances      MEDICATIONS  (STANDING):  apixaban 5 milliGRAM(s) Oral every 12 hours  atorvastatin 10 milliGRAM(s) Oral at bedtime  citalopram 40 milliGRAM(s) Oral daily  dorzolamide 2%/timolol 0.5% Ophthalmic Solution 1 Drop(s) Both EYES two times a day  latanoprost 0.005% Ophthalmic Solution 1 Drop(s) Both EYES at bedtime  levothyroxine 88 MICROGram(s) Oral daily  magnesium oxide 400 milliGRAM(s) Oral at bedtime  metoprolol tartrate 50 milliGRAM(s) Oral every 12 hours    MEDICATIONS  (PRN):  acetaminophen     Tablet .. 650 milliGRAM(s) Oral every 6 hours PRN Mild Pain (1 - 3)    LABS:                        11.2   8.38  )-----------( 343      ( 13 Jan 2025 06:22 )             35.2     01-13    139  |  109[H]  |  16  ----------------------------<  112[H]  3.8   |  25  |  0.69    Ca    9.4      13 Jan 2025 06:22  Mg     1.5     01-13        Urinalysis Basic - ( 13 Jan 2025 06:22 )    Color: x / Appearance: x / SG: x / pH: x  Gluc: 112 mg/dL / Ketone: x  / Bili: x / Urobili: x   Blood: x / Protein: x / Nitrite: x   Leuk Esterase: x / RBC: x / WBC x   Sq Epi: x / Non Sq Epi: x / Bacteria: x                       HPI: 80 yo F h/o HTN, HLD, hypothyroidism has been sick with GI symptoms (+)diarrhea/nausea since Dec 30, 24 also some cough, not feeling right, some dizziness, went to see PMD yesterday found to have new onset AF, started on eliquis/metoprolol and referred to cardiologist () today,  Pt was in AF w/RVR , sent to ER for further work-up.  Pt had no prior cardiac Hx, physically active at home.    1/10/25: Pt is resting in the bed, denies chest pain/SOB/palpitations, last diarrhea was yesterday AM.  tele: AFib upto 150's, now 100' after metoprolol 25mg po given  EKG: AFIb 124bpm, QRS 70ms, QT 356ms/QTC 511ms    1/13/25: s/p RUSH/DCCV today to NSR 60-70's bpm  Pt denies GI symptoms, tolerating po intake, states feeling better  EKG: SR 59 bpm, MT 188ms, QRS 74 ms, QTC 449ms      ROS: All other ROS is negative unless indicated above.      Physical Exam:  Vital Signs Last 24 Hrs  T(C): 36.8 (13 Jan 2025 14:11), Max: 37 (13 Jan 2025 12:34)  T(F): 98.2 (13 Jan 2025 14:11), Max: 98.6 (13 Jan 2025 12:34)  HR: 68 (13 Jan 2025 14:11) (54 - 87)  BP: 148/84 (13 Jan 2025 14:11) (107/62 - 163/90)  BP(mean): 111 (13 Jan 2025 00:20) (111 - 111)  RR: 18 (13 Jan 2025 14:11) (16 - 18)  SpO2: 100% (13 Jan 2025 14:11) (99% - 100%)    Parameters below as of 13 Jan 2025 14:11  Patient On (Oxygen Delivery Method): room air      Constitutional: well developed,  no deformities and no acute distress    Neurological: Alert & Oriented x 3, LONG, no focal deficits    HEENT: NC/AT, PERRLA, EOMI,  Neck supple.    Respiratory: CTA B/L, No wheezing/crackles/rhonchi    Cardiovascular: (+) S1 & S2, RRR, No m/r/g    Gastrointestinal: soft, NT, nondistended, (+) BS    Genitourinary: non distended bladder, voiding freely    Extremities: No pedal edema, No clubbing, No cyanosis    Skin:  normal skin color and pigmentation, no skin lesions            Allergies    No Known Allergies    Intolerances      MEDICATIONS  (STANDING):  apixaban 5 milliGRAM(s) Oral every 12 hours  atorvastatin 10 milliGRAM(s) Oral at bedtime  citalopram 40 milliGRAM(s) Oral daily  dorzolamide 2%/timolol 0.5% Ophthalmic Solution 1 Drop(s) Both EYES two times a day  latanoprost 0.005% Ophthalmic Solution 1 Drop(s) Both EYES at bedtime  levothyroxine 88 MICROGram(s) Oral daily  magnesium oxide 400 milliGRAM(s) Oral at bedtime  metoprolol tartrate 50 milliGRAM(s) Oral every 12 hours    MEDICATIONS  (PRN):  acetaminophen     Tablet .. 650 milliGRAM(s) Oral every 6 hours PRN Mild Pain (1 - 3)    LABS:                        11.2   8.38  )-----------( 343      ( 13 Jan 2025 06:22 )             35.2     01-13    139  |  109[H]  |  16  ----------------------------<  112[H]  3.8   |  25  |  0.69    Ca    9.4      13 Jan 2025 06:22  Mg     1.5     01-13        Urinalysis Basic - ( 13 Jan 2025 06:22 )    Color: x / Appearance: x / SG: x / pH: x  Gluc: 112 mg/dL / Ketone: x  / Bili: x / Urobili: x   Blood: x / Protein: x / Nitrite: x   Leuk Esterase: x / RBC: x / WBC x   Sq Epi: x / Non Sq Epi: x / Bacteria: x

## 2025-01-13 NOTE — DISCHARGE NOTE PROVIDER - NSDCMRMEDTOKEN_GEN_ALL_CORE_FT
citalopram 20 mg oral tablet: 1 tab(s) orally once a day  dorzolamide-timolol 2.23%-0.68% ophthalmic solution: 1 drop(s) to each affected eye 2 times a day  Eliquis 5 mg oral tablet: 1 tab(s) orally every 12 hours  latanoprost 0.005% ophthalmic solution: 1 drop(s) to each affected eye once a day (in the evening)  levothyroxine 88 mcg (0.088 mg) oral tablet: 1 tab(s) orally once a day  magnesium oxide 400 mg oral tablet: 1 tab(s) orally once a day (at bedtime)  metoprolol tartrate 50 mg oral tablet: 1 tab(s) orally every 12 hours  simvastatin 20 mg oral tablet: 1 tab(s) orally once a day (at bedtime)

## 2025-01-13 NOTE — DISCHARGE NOTE PROVIDER - CARE PROVIDER_API CALL
Renate Brito  Cardiovascular Disease  [address] [place] [address]  Phone: [phone]  Fax: [phone]  Follow Up Time: 1 week

## 2025-01-13 NOTE — PROGRESS NOTE ADULT - ASSESSMENT
Assessment and Plan:     Assessment and Recommendation: 	  80 yo F with above PMHx presented with newly diagnosed AF w/RVR, pt has been sick with GI symptoms- nausea/diarrhea for last 10 days. Labs showed renal insufficiency with Cr 1.9 likely from dehydration,( 1/12/24-now 0.87)   - Continue tele monitoring  - maintain K+>4.0, Mg++>2.0, f/u TSH  - Continue metoprolol 50mg po BID for rate control  -Continue  eliquis 2.5mg po BID uninterrupted  - hydration, f/u renal function  - keep NPO post MN tonight  for RUSH/DCCV in am 11/13  - Further Mx as per primary team  - cardiology following- Dr Renate Brito   -TTE  1/11/24    Left ventricular systolic function is normal with an ejection fraction visually estimated at 55 to 60 %.   2. Left atrium is moderately dilated.   3. Mild mitral regurgitation.   4. Mild tricuspid regurgitation.   5. Mild aortic regurgitation.  
79/F with PMHx of HTN, HLD, glaucoma, was sent in hy Dr. Brito for new onset A fib + rvr for RUSH and DCCV on 1/13.    Pt states that her stomach was upset for about a week and was having diarrhea which stopped yesterday. She was not eating or drinking enough fluids. She went to see Dr. Mathew yesterday and was found to be in A fib + RVR. She was started on Eliquis and she saw Dr. Brito today who sent her to ED for admission.    Pt denies any cp/sob/n/v. No diarrhea today.    In ED , pt in A fib + rvr    Pt admitted with :    # New Onset A fib + RVR:  admit to tele  start metoprolol 50 mg q 12 hrs; better rate on it  Eliquis 2.5 mg q 12 hrs ; now increased to 5 mg q 12 hrs a s Cr improved to 1.32  iv fluids done; d/scott  serial trops; neg  echo: EF 55-60%; Left atrium is moderately dilated.  cardio consult appreciated  TSH; normal  RUSH DCCV on 1/13 ; still in A fib    # Elevated Cr:  LASHAY  Cr 1.90; now 0.87  LASHAY from diarrhea  and being on HCTZ + losartan  hold them  iv fluids cgiven  daily BMP  Cr improved to 1.32; 0.87; 0.69  BMP in am  Keep K 4, Mg 2  iv Mg given    # Leukocytosis: resolved  likely from dehydration, stress reaction and recent diarrhea  recheck in am; WBC normalized    # Hypomagnesemia: Mg 1.5  replace, recheck   # Diarrhea: resolved    # Hypokalemia : K 3.0; replaced; 4.4    # HTN: cont BB    # HLD: on statin    # DVT PPX: on Eliquis    POC discussed with patient,  team.     CODE: FULL Code for now    DISPO: RUSH DCCV 1/13; still in A fib  replace Mg
80 yo F with above PMHx presented with newly diagnosed AF w/RVR, pt has been sick with GI symptoms- nausea/diarrhea for last 10 days. Labs showed renal insufficiency with Cr 1.9 likely from dehydration,( 1/12/24-now 0.87)   - Continue tele monitoring  - maintain K+>4.0, Mg++>2.0,- Continue metoprolol 50mg po BID for rate control  -Continue  eliquis 2.5mg po BID uninterrupted  - hydration,   - keep NPO post MN tonight  for RUSH/DCCV in am 11/13    -TTE  1/11/24    Left ventricular systolic function is normal with an ejection fraction visually estimated at 55 to 60 %.   2. Left atrium is moderately dilated.   3. Mild mitral regurgitation.   4. Mild tricuspid regurgitation.   5. Mild aortic regurgitation.      
78 yo F with above PMHx presented with newly diagnosed AF w/RVR, pt has been sick with GI symptoms- nausea/diarrhea for last 10 days. Labs showed renal insufficiency with Cr 1.9 likely from dehydration, leukocytosis WBC 16- which resolved now  s/p RUSH/DCCV today to NSR 60's bpm  - maintain K+>4.0, Mg++>2.0, start Mg 400mg qhs  - continue  metoprolol 50mg po BID   - eliquis increased to 5mg po BID since renal function improved  - EP will sign off, to f/u with  in 1 week  Plan d/w pt//hospitalist  
79/F with PMHx of HTN, HLD, glaucoma, was sent in hy Dr. Brito for new onset A fib + rvr for RUSH and DCCV on 1/13.    Pt states that her stomach was upset for about a week and was having diarrhea which stopped yesterday. She was not eating or drinking enough fluids. She went to see Dr. Mathew yesterday and was found to be in A fib + RVR. She was started on Eliquis and she saw Dr. Brito today who sent her to ED for admission.    Pt denies any cp/sob/n/v. No diarrhea today.    In ED , pt in A fib + rvr    Pt admitted with :    # New Onset A fib + RVR:  admit to tele  start metoprolol 50 mg q 12 hrs; better rate on it  Eliquis 2.5 mg q 12 hrs ; now increased to 5 mg q 12 hrs a s Cr improved to 1.32  iv fluids  serial trops; neg  echo  cardio consult appreciated  TSH; normal  RUSH DCCV on 1/13 if still in A fib    # Elevated Cr:  LASHAY  Cr 1.90  LASHAY from diarrhea  and being on HCTZ + losartan  hold them  iv fluids  daily BMP  Cr improved to 1.32  BMP in am  Keep K 4, Mg 2  iv Mg given    # Leukocytosis:  likely from dehydration, stress reaction and recent diarrhea  recheck in am; WBC normalized    # Diarrhea: resolved    # Hypokalemia : K 3.0; replaced    # HTN: cont BB    # HLD: on statin    # DVT PPX: on Eliquis    POC discussed with patient,  team.     CODE: FULL Code for now  
79/F with PMHx of HTN, HLD, glaucoma, was sent in hy Dr. Brito for new onset A fib + rvr for RUSH and DCCV on 1/13.    Pt states that her stomach was upset for about a week and was having diarrhea which stopped yesterday. She was not eating or drinking enough fluids. She went to see Dr. Mathew yesterday and was found to be in A fib + RVR. She was started on Eliquis and she saw Dr. Brito today who sent her to ED for admission.    Pt denies any cp/sob/n/v. No diarrhea today.    In ED , pt in A fib + rvr    Pt admitted with :    # New Onset A fib + RVR:  admit to tele  start metoprolol 50 mg q 12 hrs; better rate on it  Eliquis 2.5 mg q 12 hrs ; now increased to 5 mg q 12 hrs a s Cr improved to 1.32  iv fluids done  serial trops; neg  echo: EF 55-60%; Left atrium is moderately dilated.  cardio consult appreciated  TSH; normal  RUSH DCCV on 1/13 if still in A fib    # Elevated Cr:  LASHAY  Cr 1.90; now 0.87  LASHAY from diarrhea  and being on HCTZ + losartan  hold them  iv fluids cgiven  daily BMP  Cr improved to 1.32; o.87  BMP in am  Keep K 4, Mg 2  iv Mg given    # Leukocytosis: resolved  likely from dehydration, stress reaction and recent diarrhea  recheck in am; WBC normalized    # Diarrhea: resolved    # Hypokalemia : K 3.0; replaced; 4.4    # HTN: cont BB    # HLD: on statin    # DVT PPX: on Eliquis    POC discussed with patient,  team.     CODE: FULL Code for now    DISPO: RUSH DCCV 1/13 if still in A fib  NPO past midnight  
Assessment and Recommendation:   · Assessment	  78 yo F with above PMHx presented with newly diagnosed AF w/RVR, pt has been sick with GI symptoms- nausea/diarrhea for last 10 days. Labs showed renal insufficiency with Cr 1.9 likely from dehydration, leukocytosis WBC 16.  - Continue tele monitoring  - maintain K+>4.0, Mg++>2.0, f/u TSH  - Continue metoprolol 50mg po BID for rate control  -Continue  eliquis 2.5mg po BID uninterrupted  - hydration, f/u renal function  - keep NPO post MN on 1/12 for RUSH/DCCV on 1/13 (Monday)  - Further Mx as per primary team  - cardiology eval Dr Renate Brito   -TTE being performed today-check results once read
78 yo F with above PMHx presented with newly diagnosed AF w/RVR, pt has been sick with GI symptoms- nausea/diarrhea for last 10 days.   Labs showed renal insufficiency with Cr 1.9 likely from dehydration,( 1/12/24-now 0.87)       Symptomatic New Onset A fib w/ RVR  LASHAY - from diarrhea/meds - RESOLVED  HTN  HLD    Monitor on tele - rate controlled  Continue metoprolol 50 mg q 12 hrs   Continue Eliquis for stroke ppx  Echo: EF 55-60%; Left atrium is moderately dilated.  TSH normal  Continue statin  Hold Losartan and HCTZ for now  Plan for RUSH/DCCV today with EP  Will follow

## 2025-01-19 DIAGNOSIS — E87.6 HYPOKALEMIA: ICD-10-CM

## 2025-01-19 DIAGNOSIS — D72.829 ELEVATED WHITE BLOOD CELL COUNT, UNSPECIFIED: ICD-10-CM

## 2025-01-19 DIAGNOSIS — E78.5 HYPERLIPIDEMIA, UNSPECIFIED: ICD-10-CM

## 2025-01-19 DIAGNOSIS — I10 ESSENTIAL (PRIMARY) HYPERTENSION: ICD-10-CM

## 2025-01-19 DIAGNOSIS — E83.42 HYPOMAGNESEMIA: ICD-10-CM

## 2025-01-19 DIAGNOSIS — I48.19 OTHER PERSISTENT ATRIAL FIBRILLATION: ICD-10-CM

## 2025-01-19 DIAGNOSIS — Z79.82 LONG TERM (CURRENT) USE OF ASPIRIN: ICD-10-CM

## 2025-01-19 DIAGNOSIS — Z79.890 HORMONE REPLACEMENT THERAPY: ICD-10-CM

## 2025-01-19 DIAGNOSIS — E03.9 HYPOTHYROIDISM, UNSPECIFIED: ICD-10-CM

## 2025-01-19 DIAGNOSIS — H40.9 UNSPECIFIED GLAUCOMA: ICD-10-CM

## 2025-01-19 DIAGNOSIS — E86.0 DEHYDRATION: ICD-10-CM

## 2025-01-19 DIAGNOSIS — N17.9 ACUTE KIDNEY FAILURE, UNSPECIFIED: ICD-10-CM
